# Patient Record
Sex: MALE | Race: WHITE
[De-identification: names, ages, dates, MRNs, and addresses within clinical notes are randomized per-mention and may not be internally consistent; named-entity substitution may affect disease eponyms.]

---

## 2017-06-05 ENCOUNTER — HOSPITAL ENCOUNTER (INPATIENT)
Dept: HOSPITAL 68 - SDA | Age: 72
LOS: 2 days | Discharge: HOME HEALTH | DRG: 470 | End: 2017-06-07
Attending: ORTHOPAEDIC SURGERY | Admitting: ORTHOPAEDIC SURGERY
Payer: COMMERCIAL

## 2017-06-05 VITALS — WEIGHT: 182 LBS | BODY MASS INDEX: 29.25 KG/M2 | HEIGHT: 66 IN

## 2017-06-05 VITALS — SYSTOLIC BLOOD PRESSURE: 154 MMHG | DIASTOLIC BLOOD PRESSURE: 84 MMHG

## 2017-06-05 VITALS — DIASTOLIC BLOOD PRESSURE: 84 MMHG | SYSTOLIC BLOOD PRESSURE: 128 MMHG

## 2017-06-05 VITALS — SYSTOLIC BLOOD PRESSURE: 122 MMHG | DIASTOLIC BLOOD PRESSURE: 70 MMHG

## 2017-06-05 VITALS — DIASTOLIC BLOOD PRESSURE: 70 MMHG | SYSTOLIC BLOOD PRESSURE: 110 MMHG

## 2017-06-05 VITALS — SYSTOLIC BLOOD PRESSURE: 120 MMHG | DIASTOLIC BLOOD PRESSURE: 80 MMHG

## 2017-06-05 DIAGNOSIS — J44.9: ICD-10-CM

## 2017-06-05 DIAGNOSIS — C61: ICD-10-CM

## 2017-06-05 DIAGNOSIS — M17.11: Primary | ICD-10-CM

## 2017-06-05 DIAGNOSIS — E78.5: ICD-10-CM

## 2017-06-05 DIAGNOSIS — Z87.891: ICD-10-CM

## 2017-06-05 PROCEDURE — C1713 ANCHOR/SCREW BN/BN,TIS/BN: HCPCS

## 2017-06-05 PROCEDURE — 3E0T3BZ INTRODUCTION OF ANESTHETIC AGENT INTO PERIPHERAL NERVES AND PLEXI, PERCUTANEOUS APPROACH: ICD-10-PCS

## 2017-06-05 PROCEDURE — 0SRC0J9 REPLACEMENT OF RIGHT KNEE JOINT WITH SYNTHETIC SUBSTITUTE, CEMENTED, OPEN APPROACH: ICD-10-PCS | Performed by: ORTHOPAEDIC SURGERY

## 2017-06-05 PROCEDURE — 2NASP: CPT

## 2017-06-05 NOTE — PN- ORTHOPEDIC
Subjective
Subjective:
POSTOP CHECK
 
no pain, feeling well, tolerating reg diet, OOB ambulating already, wants yost 
out, no paresthesias, no cp/sob/n/v
 
Objective
Vital Signs and I&Os
Vital Signs
 
 
Date Time Temp Pulse Resp B/P B/P Pulse O2 O2 Flow FiO2
 
     Mean Ox Delivery Rate 
 
06/05 1441       Room Air Room Air 
 
06/05 1439      97 Room Air Room Air 
 
06/05 1422 97.2 88 18 120/80  98 Room Air  
 
06/05 1323 97.7 63 20 110/70  94 Nasal 2.5L 
 
       Cannula  
 
06/05 1300      94 Nasal 2.5L 
 
       Cannula  
 
 
 Intake & Output
 
 
 06/05 1600 06/05 0800 06/05 0000 06/04 1600 06/04 0800 06/04 0000
 
Intake Total 555     
 
Output Total      
 
Balance 555     
 
       
 
Intake, IV 75     
 
Intake, Oral 480     
 
Patient 182 lb     
 
Weight      
 
Weight Reported by Patient     
 
Measurement      
 
Method      
 
 
 
Physical Exam:
GEN: NAD
CARD: s1s2 RRR
PULM: CTAB
ABD: soft nt
EXT: RLE palp pulses, dressing CDI, ONQ in place, +dorsi/plantar flexion, 
sensation intact/equal bl
 
Assessment/Plan
Assessment/Plan
A:
POD0 sp R TKR, stable, OOB ambulating.
 
P: 
OOB with PT
DC yost
coum per INR
prn pain meds
dc planning
 
 
 
Core Measures/Miscellaneous
 
Venous Thromboembolism
VTE Risk Factors: Surgery
VTE Contraindications: No Contraindications
VTE Diagnosis: No
 
Beta Blocker
Is Beta Blocker a Home Med? No
 
Antibiotics
Is Patient on Antibiotics? Yes

## 2017-06-05 NOTE — OPERATIVE REPORT
Operative/Inv Procedure Report
Surgery Date: 06/05/17
Name of Procedure:
Right total knee arthroplasty
#2 grafting of large tibial cyst
Pre-Operative Diagnosis:
Right knee primary osteoarthritis
Post-Operative Diagnosis:
Same
Estimated Blood Loss: less than 50ml
Surgeon/Assistant:
YON RODRÍGUEZ,RICARDA SANTOS
 
Anesthesia: block
Implants:
Dingle triathlon total knee system-right size 5 femur, size 6 tibia, 11 mm 
cruciate retaining polyethylene, 31 patella
Drains:
None
Specimens:
Femoral, tibial, patellar bone
Microbiology:
Urine
Tourniquet:
56 minutes
Complications:
None
Condition:
Stable
Operative Indication:
Patient is a 71-year-old man with a long history of gradually worsening right 
knee pain.  On examination he was found to have a significant varus deformity 
and antalgic gait as well as significant varus thrust.  X-rays confirmed severe 
end-stage osteoarthritis.  Patient underwent conservative measures but minimal 
relief.  He wished to proceed with total knee arthroplasty after risks benefits 
and expectations of the surgical procedure were discussed which included but 
were not limited to persistent knee pain, need for subsequent surgery, infection
, DVT, injury to blood vessel or nerve, anesthesia risks.
 
Operative/Procedure Note
Note:
Patient was brought to the operating room and transferred to the operating 
table.  Once under appropriate anesthesia the right lower extremity was prepped 
and draped in standard fashion.  Preoperative IV antibiotics were given 
prophylactically.  The leg was elevated exsanguinated tourniquet was inflated.  
A standard anterior incision was made for anticipated medial parapatellar 
approach to the knee.  Incision was taken down sharply to the underlying 
retinaculum.  Incision was made in the medial retinaculum extending into the 
quadriceps tendon medially.  Small effusion was evacuated.  Osteophytes were 
excised.  There was tricompartmental osteoarthritic disease.  Severe end-stage 
eburnated bone along the medial compartment.  Laxity in the lateral aspect of 
the knee.  Remnants of the medial lateral meniscal tissues were excised.  
Remnants of the ACL was excised.  Knee was flexed patella was subluxed.  The 
drill was used to enter the intramedullary canal for the intramedullary guide 
for the distal femoral cut.  A 6 valgus cut was chosen.  This cut was made 
while protecting the soft tissues with retractors.  The femur was then sized to 
a size 5.  Size 5 cutting guide was pinned in place and the cuts were made in 
standard fashion.  I was satisfied with the preparation of the femur.  I then 
turned my attention to the tibia.  I placed the external tibial alignment guide 
in place for my tibial cut.  The cutting block was pinned in position for a 
neutral cut from medial to lateral and reproducing patient's posterior slow-
paced on preoperative templating and intraoperative measurements.  Soft tissues 
were protected medially laterally as well as posteriorly the PCL retractor.  The
PCL was recessed.  A large cyst was evaluated in the posterior aspect of the 
tibia and the central portion is extended all the way to the back wall the tibia
and extended to the mid tibia.  Soft tissues debrided for and within the cyst.  
There was sclerotic bone surrounding this area.  I then turned my attention to 
balancing the knee.  There was significant laxity laterally compared to 
medially.  Soft tissues were balanced at that point time.  I then measured the 
tibia to a size 6.  A trial was used with a size 6 tibia size 5 femur and a 9 mm
polyethylene.  The knee was taken out into full extension.  It was good full 
extension and stability in all planes however appeared that patient would most 
likely require a 11 mm insert this was to be determined and confirm later on the
case.  I then turned my attention to the patella.  The patella was measured and 
the appropriate thickness was removed and then restored with a size 31 patella. 
The 3 lug holes were drilled as medially as possible to maximize patellar 
tracking.  I was satisfied with patellar tracking.  This was would be checked 
again after the tourniquet was deflated.  I then marked my rotation of tibia 
drilled my 2 lug holes for the femur and removed all instruments from the knee. 
I then used the tibial punch to complete the preparation of the tibia with the 
appropriate position of the punch to match rotation I was predetermined by the 
trial.  I then removed all his from its and copious irrigation followed.  I used
the anterior chamfer bone to plug the distal femur to minimize postoperative 
hemarthrosis and postoperative swelling.  We also morselized portions of the 
condyles and filled the cystic defect in the tibia.  This was done after copious
irrigation of the bony surfaces.  Cement was being mixed on the back table.  
Once it was ready was applied to the dry clean bony surfaces of the tibia.  The 
size 6 tibia was impacted in place with the excess cement removed with curettes.
 Cement was applied to the dry clean bony surfaces of the femur.  The size 5 
femur was impacted in place and excess cement was removed with curettes.  The 9 
mm insert was placed in the knee and the knee was taken out to full extension.  
Cement was applied to the dry clean bony surfaces of the patella.  The size 31 
patella was impacted in place and excess cement was removed with a knife.  As 
cement was hardening I did appear articular pericapsular injection of a cocktail
which included ropivacaine with epinephrine and Toradol for postoperative pain 
and inflammation management.  Once the cement was hardening took the knee 
through range of motion.  Small pieces of excess cement were removed with 
osteotome.  I then trialed 11 mm insert.  I was satisfied with the stability in 
all planes with the 11 mm insert.  Full extension extension.  Good stability in 
full extension mid flexion and full flexion to gravity.  I then removed the 
trial component copious irrigated tibial tray.  I major there was no bone 
fragments, cement fragments, soft tissue and then I impacted the definitive size
11 mm cruciate retaining polyethylene into place.  The locking mechanism was 
confirmed.  Took the knee through range of motion.  Again I was satisfied with 
the stability in all planes.  I then copiously irrigated the knee.  The 
tourniquet was deflated and hemostasis was obtained.  There was no need for a 
drain.  I then copious irrigation irrigated the knee and every level closure was
followed by copious irrigation.  The retinacular incision was closed in the 
quadriceps tendon was closed with interrupted #1 Vicryl suture.  Subcutaneous 
tissues closed in 2 layers with 2-0 Vicryl skin was closed running 3-0 Vicryl 
suture with the knee in flexion.  Appropriate dressings were applied and patient
was awakened and taken the recovery room in good condition.  No intraoperative 
complications.  Blood loss was less than 50 mL
Discharge Disposition: PACU

## 2017-06-06 VITALS — DIASTOLIC BLOOD PRESSURE: 50 MMHG | SYSTOLIC BLOOD PRESSURE: 104 MMHG

## 2017-06-06 VITALS — DIASTOLIC BLOOD PRESSURE: 80 MMHG | SYSTOLIC BLOOD PRESSURE: 160 MMHG

## 2017-06-06 VITALS — DIASTOLIC BLOOD PRESSURE: 80 MMHG | SYSTOLIC BLOOD PRESSURE: 124 MMHG

## 2017-06-06 VITALS — SYSTOLIC BLOOD PRESSURE: 90 MMHG | DIASTOLIC BLOOD PRESSURE: 50 MMHG

## 2017-06-06 VITALS — SYSTOLIC BLOOD PRESSURE: 132 MMHG | DIASTOLIC BLOOD PRESSURE: 68 MMHG

## 2017-06-06 VITALS — SYSTOLIC BLOOD PRESSURE: 112 MMHG | DIASTOLIC BLOOD PRESSURE: 68 MMHG

## 2017-06-06 VITALS — SYSTOLIC BLOOD PRESSURE: 120 MMHG | DIASTOLIC BLOOD PRESSURE: 80 MMHG

## 2017-06-06 LAB
ABSOLUTE GRANULOCYTE CT: 8.8 /CUMM (ref 1.4–6.5)
BASOPHILS # BLD: 0.1 /CUMM (ref 0–0.2)
BASOPHILS NFR BLD: 0.5 % (ref 0–2)
EOSINOPHIL # BLD: 0.1 /CUMM (ref 0–0.7)
EOSINOPHIL NFR BLD: 0.6 % (ref 0–5)
ERYTHROCYTE [DISTWIDTH] IN BLOOD BY AUTOMATED COUNT: 14.2 % (ref 11.5–14.5)
GRANULOCYTES NFR BLD: 74.1 % (ref 42.2–75.2)
HCT VFR BLD CALC: 40.2 % (ref 42–52)
LYMPHOCYTES # BLD: 1.5 /CUMM (ref 1.2–3.4)
MCH RBC QN AUTO: 28.3 PG (ref 27–31)
MCHC RBC AUTO-ENTMCNC: 33.3 G/DL (ref 33–37)
MCV RBC AUTO: 84.9 FL (ref 80–94)
MONOCYTES # BLD: 1.5 /CUMM (ref 0.1–0.6)
PLATELET # BLD: 187 /CUMM (ref 130–400)
PMV BLD AUTO: 9.5 FL (ref 7.4–10.4)
PROTHROMBIN TIME: 13.3 SEC (ref 9.4–12.5)
RED BLOOD CELL CT: 4.74 /CUMM (ref 4.7–6.1)
WBC # BLD AUTO: 11.9 /CUMM (ref 4.8–10.8)

## 2017-06-06 NOTE — SURGICAL DISCHARGE SUMMARY
Visit Information
 
Visit Dates
Admission Date:
06/05/17
 
Discharge Date:
6/7/17
 
 
History of Present Illness
Chief Complaint:
Right knee primary osteoarthritis
 
Medical History
Blood Transfusion Hx: No
Neurological: NONE
EENT: NONE
Cardiovascular: HYPERCHOLESTERMIA
Respiratory: COPD
Gastrointestinal: NONE
Hepatic: NONE
Renal: NONE
Musculoskeletal: degen joint disease
Psychiatric: NONE
Endocrine: NONE
Blood Disorders: NONE
Cancer(s): prostate cancer, SKIN CA
GYN/Reproductive: NONE
History of MRSA: No
History of VRE: No
History of CDIFF: No
Isolation History: Standard
 
Surgical History
Pertinent Surgical History: TONSILLECTOMY PROSTATECTOMY ULNAR NERVE REPAIR SKIN 
CA EXCISION
 
Psychosocial History
Where Do You Live? Home
Who Do You Live With? Spouse
What is Your Primary Language? English
Review of Systems:
SEE H&P
 
Hospital Course
 
Course
Attending Physician:
YON RODRÍGUEZ,[facility]
 
Primary Care Physician:
PATRICIA RODRÍGUEZ,[facility] Course:
Elective scheduled right total knee arthroplasty and grafting of large tibial 
cyst on 6/5/17 for right knee primary osteoarthritis by . Coumadin 
started post-operatively for blood clot risk reduction. Pain control 
transitioned from iv to oral medication. Seen and evaluated daily by PT, with 
the goal of going home with services on post-op day #2.
 
Complications:
None
Allergies:
Coded Allergies:
lactose (GI UPSET 05/26/17)
 
 
Disposition Summary
 
Disposition
Principal Diagnosis:
Right knee primary osteoarthritis
Additional Diagnosis:
same, s/p
 
Right total knee arthroplasty
grafting of large tibial cyst
 
Discharge Disposition: home health services
 
Discharge Instructions
 
General Discharge Information
Code Status: Full Code
Patient's Diet:
regular diet as tolerated. coumadin considerations.
Patient's Activity:
weight bearing as tolerated. rolling walker assistance.
Follow-Up Instructions/Appts:
continue dry guaze dressing changes daily, right knee
blood draws for PT/INR, goal INR 2-3. coumadin dosing accordingly
continue PT
follow up with  as outpatient in 3-4 weeks
 
Medications at Discharge
Discharge Medications:
Stop taking the following medications:
Docusate Sodium (Colace) 100 MG CAPSULE ORAL DAILY 
 
Continue taking these medications:
Aspirin (Ecotrin*) 81 MG TABLET.
    1 Tablet ORAL DAILY
    Comments:
       NOT GIVEN IN HOSPITAL
 
Ferrous Sulfate (Ferrous Sulfate) 325 MG (65 MG IRON) TABLET
    1 Tablet ORAL DAILY
    Comments:
       Last Taken: 6/7/17
             Time: 9AM
 
Rosuvastatin Calcium (Crestor) 10 MG TABLET
    1 Tablet ORAL DAILY
    Comments:
       NOT GIVEN IN HOSPITAL
 
Glycopyrrolate/Formoterol Fum (Bevespi Aerosphere Inhaler) 9 MCG-4.8 MCG 
HFA.AER.AD
    2 PUFF ORAL TWICE DAILY
    Comments:
       NOT GIVEN IN HOSPITAL
 
Fluticasone Propionate (Flonase Allergy Relief) 50 MCG/ACTUATION SPRAY.SUSP
     DAILY
    Comments:
       NOT GIVEN IN HOSPITAL
 
Start taking the following new medications:
Docusate Sodium (Docusate Sodium) 100 MG CAPSULE
    100 Milligram ORAL TWICE DAILY as needed for CONSTIPATION
    Days = 14
    No Refills
    Instructions:
       STOOL SOFTENER, AVAILABLE OVER THE COUNTER
    Comments:
       Last Taken: 6/7/17
             Time: 9AM
 
Warfarin Sodium (Coumadin) 5 MG TABLET
    5 Milligram ORAL DAILY
    Qty = 30
    No Refills
    Instructions:
       DOSE ADJUSTMENT AS PER BLOOD DRAWS FOR PT/INR. GOAL INR 2-3.
    Comments:
       Last Taken: 6/6/17
             Time: 5PM
 
Oxycodone HCl/Acetaminophen (Percocet 5-325 MG Tablet) 5 MG-325 MG TABLET
    1-2 Tablet ORAL EVERY 4-6 HOURS AS NEEDED as needed for PAIN CONTROL
    Qty = 36
    No Refills
    Instructions:
       TAKE AS DIRECTED FOR PAIN CONTROL. DO NOT COMBINE WITH TYLENOL.
    Comments:
       Last Taken: 6/6/17
             Time: 8AM
 
 
Copies To:
PATRICIA RODRÍGUEZ,ALPHONSO

## 2017-06-06 NOTE — PATIENT DISCHARGE INSTRUCTIONS
Discharge Instructions
 
General Discharge Information
You were seen/treated for:
Right knee primary osteoarthritis
You had these procedures:
Surgery Date: 06/05/17
Name of Procedure:
Right total knee arthroplasty
#2 grafting of large tibial cyst
 
Watch for these problems:
FEVER>101.3, INCREASED PAIN, REDNESS/SWELLING/DRAINAGE
Other wound care:
DRY GUAZE DRESSING CHANGE DAILY. LEAVE WHITE STERI STRIPS IN PLACE.
 
Diet
Continue normal diet: Yes
Recommended Diet: Regular
Additional DIET Information:
COUMADIN CONSIDERATIONS
 
Activity
Full Activity/No Limits: Yes
Activity Self Limited: Yes
Activity Limited to: Weight bear as tolerated
Other activity limits:
AMBULATE WITH ROLLING WALKER ASSISTANCE
 
Acute Coronary Syndrome
 
Inclusion Criteria
At DC or during hospital stay patient has or had the following:
ACS DIAGNOSIS No
 
Discharge Core Measures
Meds if any: Prescribed or Continued at Discharge
Meds if any: NOT Prescribed or Continued at Discharge
 
Congestive Heart Failure
 
Inclusion Criteria
At DC or during hospital stay patient has or had the following:
CHF DIAGNOSIS No
 
Discharge Core Measures
Meds if any: Prescribed or Continued at Discharge
Meds if any: NOT Prescribed or Continued at Discharge
 
Cerebrovascular accident
 
Inclusion Criteria
At DC or during hospital stay patient has or had the following:
CVA/TIA Diagnosis No
 
Discharge Core Measures
Meds if any: Prescribed or Continued at Discharge
Meds if any: NOT Prescribed or Continued at Discharge
 
Venous thromboembolism
 
Inclusion Criteria
VTE Diagnosis No
VTE Type NONE
VTE Confirmed by (Test) NONE
 
Discharge Core Measures
- Per Current guidelines, there needs to be overlap
- treatment for the first 5 days of Warfarin therapy.
- If discharged on Warfarin prior to 5 days of
- overlap therapy, the patient will need to be
- assessed for post discharge needs including
- *Post discharge parental anticoagulation
- *Warfarin and/or parental anticoagulation education
- *Follow up date to check INR post discharge
At least 5 days overlap therapy as Inpatient No
Meds if any: Prescribed or Continued at Discharge
Note: Overlap Therapy is Warfarin and Anticoagulant
Meds if any: NOT Prescribed or Continued at Discharge

## 2017-06-06 NOTE — PN- ORTHOPEDIC
**See Addendum**
Subjective
Subjective:
POD#1 S/P RIGHT TKA
 
C/O UPPER RESP SXS NOT ALLOWING HIM TO GET SLEEP LAST NIGHT
OTHERWISE NO MAJOR ISSUES
DENEIS CP, SOB, NO N+V
 
Objective
Vital Signs and I&Os
Vital Signs
 
 
Date Time Temp Pulse Resp B/P B/P Pulse O2 O2 Flow FiO2
 
     Mean Ox Delivery Rate 
 
06/06 0740 98.3 96 20 120/80  92 Room Air  
 
06/06 0400 98.6 98 20 132/68  93 Nasal 2.5L 
 
       Cannula  
 
06/06 0227 99.5 95 20 124/80  92 Nasal 2.0L 
 
       Cannula  
 
06/05 2212 99.0 94 18 154/84  91   
 
06/05 1846 98.2 84 18 128/84  91   
 
06/05 1631 98.7 84 20 122/70  93   
 
06/05 1441       Room Air Room Air 
 
06/05 1439      97 Room Air Room Air 
 
06/05 1422 97.2 88 18 120/80  98 Room Air  
 
06/05 1323 97.7 63 20 110/70  94 Nasal 2.5L 
 
       Cannula  
 
06/05 1300      94 Nasal 2.5L 
 
       Cannula  
 
 
 Intake & Output
 
 
 06/06 0800 06/06 0000 06/05 1600 06/05 0800 06/05 0000 06/04 1600
 
Intake Total 360 660 555   
 
Output Total 450 300    
 
Balance -90 360 555   
 
       
 
Intake, IV  300 75   
 
Intake, Oral 360 360 480   
 
Output, Urine 450 300    
 
Patient   182 lb   
 
Weight      
 
Weight   Reported by Patient   
 
Measurement      
 
Method      
 
 
 
Physical Exam:
CV: RRR
LUNGS: CLEAR
ABD: SOFT, +BS
EXT: DRSG DRY
       DISTAL CMS INTACT BILAT
       NO CALF TENDERNESS
 
Assessment/Plan
Assessment/Plan
ORTHO STABLE
 
PLAN
OOB WITH PT/STAIRS TODAY
TITRATE COUMADIN DOSE FOR INR 2-3
F/U AM LABS
HOME D/C PLANNING
 
 
Core Measures/Miscellaneous
 
Venous Thromboembolism
VTE Risk Factors: Surgery
VTE Contraindications: No Contraindications
VTE Diagnosis: No
 
Beta Blocker
Is Beta Blocker a Home Med? No
 
Antibiotics
Is Patient on Antibiotics? Yes

## 2017-06-07 VITALS — DIASTOLIC BLOOD PRESSURE: 68 MMHG | SYSTOLIC BLOOD PRESSURE: 144 MMHG

## 2017-06-07 LAB
ABSOLUTE GRANULOCYTE CT: 10.2 /CUMM (ref 1.4–6.5)
BASOPHILS # BLD: 0 /CUMM (ref 0–0.2)
BASOPHILS NFR BLD: 0.3 % (ref 0–2)
EOSINOPHIL # BLD: 0.5 /CUMM (ref 0–0.7)
EOSINOPHIL NFR BLD: 3.5 % (ref 0–5)
ERYTHROCYTE [DISTWIDTH] IN BLOOD BY AUTOMATED COUNT: 14.1 % (ref 11.5–14.5)
GRANULOCYTES NFR BLD: 78.1 % (ref 42.2–75.2)
HCT VFR BLD CALC: 35.3 % (ref 42–52)
LYMPHOCYTES # BLD: 1 /CUMM (ref 1.2–3.4)
MCH RBC QN AUTO: 28.7 PG (ref 27–31)
MCHC RBC AUTO-ENTMCNC: 33.2 G/DL (ref 33–37)
MCV RBC AUTO: 86.4 FL (ref 80–94)
MONOCYTES # BLD: 1.4 /CUMM (ref 0.1–0.6)
PLATELET # BLD: 174 /CUMM (ref 130–400)
PMV BLD AUTO: 9.2 FL (ref 7.4–10.4)
PROTHROMBIN TIME: 16.4 SEC (ref 9.4–12.5)
RED BLOOD CELL CT: 4.08 /CUMM (ref 4.7–6.1)
WBC # BLD AUTO: 13.1 /CUMM (ref 4.8–10.8)

## 2017-06-07 NOTE — RADIOLOGY REPORT
EXAMINATION:
XR KNEE, RIGHT
 
CLINICAL INFORMATION:
Right total knee arthroplasty.
 
COMPARISON:
None
 
TECHNIQUE:
Right knee, AP and lateral views.
 
FINDINGS:
The components of the total knee arthroplasty are in satisfactory position
and there is normal alignment at the patellofemoral and tibiofemoral
compartments. No evidence of acute periprosthetic fracture. There is
postoperative soft tissue swelling and soft tissue emphysema of the anterior
knee.
 
IMPRESSION:
Satisfactory position and alignment of components of the right total knee
arthroplasty. No acute periprosthetic fracture.

## 2017-06-07 NOTE — PN- ORTHOPEDIC
**See Addendum**
Subjective
Subjective:
Patient's pain is much better controlled today.  He has no complaints.  No acute
events overnight, no fever or flulike illness
 
Objective
Vital Signs and I&Os
Vital Signs
 
 
Date Time Temp Pulse Resp B/P B/P Pulse O2 O2 Flow FiO2
 
     Mean Ox Delivery Rate 
 
06/07 0603 98.6 90 20 144/68  91 Nasal  
 
       Cannula  
 
06/07 0000      95 Nasal 3.0L 
 
       Cannula  
 
06/06 2224 98.1 100 20 160/80  95   
 
06/06 1733    112/68     
 
06/06 1600      92 Nasal 3.0L 
 
       Cannula  
 
06/06 1400 98.3 82 18 104/50  92 Nasal 3.0L 
 
       Cannula  
 
06/06 1130 99.5 72 18 90/50  92 Nasal 3.0L 
 
       Cannula  
 
06/06 0800       Nasal 2.5L 
 
       Cannula  
 
06/06 0740 98.3 96 20 120/80  92 Room Air  
 
 
 Intake & Output
 
 
 06/07 0800 06/07 0000 06/06 1600 06/06 0800 06/06 0000 06/05 1600
 
Intake Total 440  800 360 660 555
 
Output Total  300 50 450 300 
 
Balance 440 -300 750 -90 360 555
 
       
 
Intake,   500  300 75
 
Intake, Oral 240  300 360 360 480
 
Number 1     
 
Bowel      
 
Movements      
 
Output,   50   
 
Emesis      
 
Output, Urine  300  450 300 
 
Patient      182 lb
 
Weight      
 
Weight      Reported by Patient
 
Measurement      
 
Method      
 
 
 
Physical Exam:
Well-developed well-nourished no apparent distress.
HEENT: Atraumatic, extraocular motion intact
Neck: Supple, no lymphadenopathy
Respiratory: No respiratory distress
Extremities: No edema 
 
RIGHT lower extremity dressing in place, 
Incision line is clean dry and intact with minimal bloody drainage. No signs of 
infection. 
Mild joint effusion
Range of motion is 0-60. 
Compression wrap in place.  
ALPS in place
Neurovascularly intact distally
Bilateral calves are supple, nontender.
Neuro: Alert and oriented x3
Psych: Mood affect normal, normal memory normal judgment.
Skin: Warm and dry, no rash on exposed skin
 
Results
Last 48 Hours of Labs:
 Laboratory Tests
 
 
 06/07 06/06
 
 0600 0620
 
Chemistry  
 
  Sodium (137 - 145 mmol/L)  137
 
  Potassium (3.5 - 5.1 mmol/L)  4.4
 
  Chloride (98 - 107 mmol/L)  103
 
  Carbon Dioxide (22 - 30 mmol/L)  25
 
  Anion Gap (5 - 16)  10
 
  BUN (9 - 20 mg/dL)  15
 
  Creatinine (0.7 - 1.2 mg/dL)  0.7
 
  Estimated GFR (>60 ml/min)  > 60
 
  BUN/Creatinine Ratio (7 - 25 %)  21.4
 
Coagulation  
 
  PT (9.4 - 12.5 SEC) Pending 13.3  H
 
  INR (0.90 - 1.17) Pending 1.27  H
 
Hematology  
 
  CBC w Diff Pending NO MAN DIFF REQ
 
  WBC (4.8 - 10.8 /CUMM) Pending 11.9  H
 
  RBC (4.70 - 6.10 /CUMM) Pending 4.74
 
  Hgb (14.0 - 18.0 G/DL) Pending 13.4  L
 
  Hct (42 - 52 %) Pending 40.2  L
 
  MCV (80.0 - 94.0 FL) Pending 84.9
 
  MCH (27.0 - 31.0 PG) Pending 28.3
 
  RDW (11.5 - 14.5 %) Pending 14.2
 
  Plt Count (130 - 400 /CUMM) Pending 187
 
  MPV (7.4 - 10.4 FL) Pending 9.5
 
  Gran % (42.2 - 75.2 %)  74.1
 
  Lymphocytes % (20.5 - 51.1 %)  12.5  L
 
  Monocytes % (1.7 - 9.3 %)  12.3  H
 
  Eosinophils % (0 - 5 %)  0.6
 
  Basophils % (0.0 - 2.0 %)  0.5
 
  Absolute Granulocytes (1.4 - 6.5 /CUMM)  8.8  H
 
  Absolute Lymphocytes (1.2 - 3.4 /CUMM)  1.5
 
  Absolute Monocytes (0.10 - 0.60 /CUMM)  1.5  H
 
  Absolute Eosinophils (0.0 - 0.7 /CUMM)  0.1
 
  Absolute Basophils (0.0 - 0.2 /CUMM)  0.1
 
  PUBS MCHC (33.0 - 37.0 G/DL) Pending 33.3
 
 
 
 
Assessment/Plan
Assessment/Plan
Postop day #2 status post right total knee arthroplasty
 
 
Wean O2
Pain medication as needed
DVT prophylaxis with Coumadin, adjust dose per INR-P
Labs pending this morning
Perioperative antibiotics completed
Out of bed with physical therapy
Possible discharge to home this afternoon as patient states that he would like 
to, and he feels well to go home today.  We'll need to wean his O2 and check 
morning labs
dressing changed today
cont alps
 
 
Core Measures/Miscellaneous
 
Venous Thromboembolism
VTE Risk Factors: Surgery
VTE Contraindications: No Contraindications
VTE Diagnosis: No
 
Beta Blocker
Is Beta Blocker a Home Med? No
 
Antibiotics
Is Patient on Antibiotics? Yes

## 2018-01-06 ENCOUNTER — HOSPITAL ENCOUNTER (INPATIENT)
Dept: HOSPITAL 68 - ERH | Age: 73
LOS: 3 days | DRG: 312 | End: 2018-01-09
Attending: INTERNAL MEDICINE | Admitting: INTERNAL MEDICINE
Payer: COMMERCIAL

## 2018-01-06 VITALS — HEIGHT: 66 IN | BODY MASS INDEX: 26.03 KG/M2 | WEIGHT: 162 LBS

## 2018-01-06 VITALS — DIASTOLIC BLOOD PRESSURE: 64 MMHG | SYSTOLIC BLOOD PRESSURE: 118 MMHG

## 2018-01-06 VITALS — SYSTOLIC BLOOD PRESSURE: 130 MMHG | DIASTOLIC BLOOD PRESSURE: 60 MMHG

## 2018-01-06 DIAGNOSIS — Z85.46: ICD-10-CM

## 2018-01-06 DIAGNOSIS — R09.02: ICD-10-CM

## 2018-01-06 DIAGNOSIS — E78.5: ICD-10-CM

## 2018-01-06 DIAGNOSIS — R55: Primary | ICD-10-CM
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
## 2018-01-06 DIAGNOSIS — S01.81XA: ICD-10-CM

## 2018-01-06 DIAGNOSIS — R00.0: ICD-10-CM

## 2018-01-06 DIAGNOSIS — J44.9: ICD-10-CM

## 2018-01-06 DIAGNOSIS — Z85.828: ICD-10-CM

## 2018-01-06 DIAGNOSIS — I25.2: ICD-10-CM

## 2018-01-06 DIAGNOSIS — E78.00: ICD-10-CM
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
## 2018-01-06 LAB
ABSOLUTE GRANULOCYTE CT: 11.3 /CUMM (ref 1.4–6.5)
APTT BLD: 31 SEC (ref 25–37)
BASOPHILS # BLD: 0.1 /CUMM (ref 0–0.2)
BASOPHILS NFR BLD: 0.5 % (ref 0–2)
EOSINOPHIL # BLD: 0.2 /CUMM (ref 0–0.7)
EOSINOPHIL NFR BLD: 1.2 % (ref 0–5)
ERYTHROCYTE [DISTWIDTH] IN BLOOD BY AUTOMATED COUNT: 15.7 % (ref 11.5–14.5)
GRANULOCYTES NFR BLD: 80.2 % (ref 42.2–75.2)
HCT VFR BLD CALC: 47.4 % (ref 42–52)
LYMPHOCYTES # BLD: 1.7 /CUMM (ref 1.2–3.4)
MCH RBC QN AUTO: 28 PG (ref 27–31)
MCHC RBC AUTO-ENTMCNC: 32 G/DL (ref 33–37)
MCV RBC AUTO: 87.7 FL (ref 80–94)
MONOCYTES # BLD: 0.8 /CUMM (ref 0.1–0.6)
PLATELET # BLD: 261 /CUMM (ref 130–400)
PMV BLD AUTO: 8.4 FL (ref 7.4–10.4)
PROTHROMBIN TIME: 11.2 SEC (ref 9.4–12.5)
RED BLOOD CELL CT: 5.4 /CUMM (ref 4.7–6.1)
WBC # BLD AUTO: 14.1 /CUMM (ref 4.8–10.8)

## 2018-01-06 PROCEDURE — G0378 HOSPITAL OBSERVATION PER HR: HCPCS

## 2018-01-06 PROCEDURE — 0HQ1XZZ REPAIR FACE SKIN, EXTERNAL APPROACH: ICD-10-PCS | Performed by: PEDIATRICS

## 2018-01-06 NOTE — CONS- NEUROLOGY
General Information and HPI
 
Consulting Request
Date of Consult: 01/06/18
Requested By:
James Mcgregor MD
 
History of Present Illness:
72-year-old male presents following an episode of unwitnessed fall and blunt 
head trauma.  The patient states that several minutes after arising from bed, 
going to the kitchen and beginning to eat a cookie, he abruptly fell.  His wife 
heard a thud from the other room and was quickly on the scene.  There were no 
reported involuntary movements.  He suffered mild facial trauma.  He denies any 
prior history of syncope or seizure.  No recent intercurrent illness.  He denies
palpitations.  On the way to the floor, nursing has noted an irregular 
heartbeat.
 
Allergies/Medications
Allergies:
Coded Allergies:
lactose (GI UPSET 08/07/17)
 
Home Med List:
Albuterol Sulfate (Proair Hfa) 90 MCG HFA.AER.AD   2 PUF INH Q4-6 PRN PRN copd  
(Reported)
Docusate Sodium 100 MG CAPSULE   100 MG PO BID PRN CONSTIPATION
     STOOL SOFTENER, AVAILABLE OVER THE COUNTER
Duloxetine HCl 60 MG CAPSULE.DR   1 CAP PO DAILY DEPRESSION  (Reported)
Fluticasone Propionate (Flonase Allergy Relief) 50 MCG/ACTUATION SPRAY.SUSP 
nasal congestion  (Reported)
Glycopyrrolate/Formoterol Fum (Bevespi Aerosphere Inhaler) 9 MCG-4.8 MCG 
HFA.AER.AD   2 PUFF PO BID COPD  (Reported)
Mirtazapine 15 MG TABLET   1 TAB PO QPM SLEEP HELP  (Reported)
Rosuvastatin Calcium (Crestor) 10 MG TABLET   1 TAB PO DAILY CHOLESTEROL  (
Reported)
 
 
Review of Systems
Review of Systems:
Chronic shortness of breath for which she sees Dr. Dozier no recent fever, chills
, rash, diplopia, dysarthria, dysphagia, chest pain, palpitations, vomiting, 
vertigo, gait ataxia, joint inflammation or bleeding disturbance
 
Past History
 
Travel History
Traveled to [place] past 21 day No
 
Medical History
Neurological: NONE
EENT: NONE
Cardiovascular: HYPERCHOLESTERMIA
Respiratory: COPD
Gastrointestinal: NONE
Hepatic: NONE
Renal: NONE
Musculoskeletal: degen joint disease
Psychiatric: NONE
Endocrine: NONE
Blood Disorders: NONE
Cancer(s): prostate cancer, SKIN CA
GYN/Reproductive: NONE
 
Surgical History
Surgical History: TONSILLECTOMY PROSTATECTOMY ULNAR NERVE REPAIR SKIN CA 
EXCISION
 
Exam & Diagnostic Data
Vital Signs and I&O
Vital Signs
 
 
Date Time Temp Pulse Resp B/P B/P Pulse O2 O2 Flow FiO2
 
     Mean Ox Delivery Rate 
 
01/06 1711 98.7 96 20 130/60  93 Nasal 2.0L 
 
       Cannula  
 
01/06 1625 98.9 95 18 120/72  95 Room Air  
 
01/06 1402 99.0 94 20 121/80  94 Nasal  
 
       Cannula  
 
01/06 1212 98.1 98 20 133/74  93 Nasal 2.0L 
 
       Cannula  
 
01/06 1017 98.2 99 20 156/64  93 Nasal 2.0L 
 
       Cannula  
 
01/06 0637  114  150/67     
 
01/06 0625 98.4 110 20 128/79  93 Nasal 3.0L 
 
       Cannula  
 
01/06 0444 98.5 111 20 120/71  93 Nasal 2.0L 
 
       Cannula  
 
01/06 0136 98.5 93 20 150/72  94 Room Air  
 
 
 Intake & Output
 
 
 01/06 1600 01/06 0800 01/06 0000
 
Intake Total   
 
Output Total   
 
Balance   
 
    
 
Patient  162 lb 
 
Weight   
 
Weight  Reported by Patient 
 
Measurement   
 
Method   
 
 
Pleasant elderly gentleman in no acute distress.  Higher cortical function was 
intact.  Speech was fluent.  Head was normocephalic.  There was an early left 
periorbital ecchymosis.  Pupils were equal and reactive.  Extraocular movements 
were full.  Face was symmetric.  Hearing was normal.  Tongue was midline.  Motor
examination showed no focal or lateralizing weakness.  Deep tendon reflexes were
symmetric.  Plantar responses were equivocally extensor bilaterally.  Gait was 
untested.
 
CT scan of head showed no acute abnormalities.
 
Assessment/Plan
Assessment:
#1 syncope
 
#2 blunt head trauma
 
Primary CNS disturbance is considered highly unlikely.  Cardiac etiology should 
be explored.  Should no obvious underlying reason for his syncope be discovered,
EEG would then be recommended however yield expected to be low.
Recommendations:
As above
 
Please feel free to call with any further questions.
 
 
Consult Acknowledgment
- Thank you for your consult request.

## 2018-01-06 NOTE — PN- ATT ADDEND
Attending Addendum
Attending Brief Note
S: The patient denied any further syncope or symptoms other than fatigue. Admits
po intake has been only fair. Noted to be tachycardic on monitor (100-110).
 
O:
VS:
 Vital Signs
 
 
Date Time Temp Pulse Resp B/P B/P Pulse O2 O2 Flow FiO2
 
     Mean Ox Delivery Rate 
 
01/06 1711 98.7 96 20 130/60  93 Nasal 2.0L 
 
       Cannula  
 
01/06 1625 98.9 95 18 120/72  95 Room Air  
 
01/06 1402 99.0 94 20 121/80  94 Nasal  
 
       Cannula  
 
01/06 1212 98.1 98 20 133/74  93 Nasal 2.0L 
 
       Cannula  
 
01/06 1017 98.2 99 20 156/64  93 Nasal 2.0L 
 
       Cannula  
 
01/06 0637  114  150/67     
 
01/06 0625 98.4 110 20 128/79  93 Nasal 3.0L 
 
       Cannula  
 
01/06 0444 98.5 111 20 120/71  93 Nasal 2.0L 
 
       Cannula  
 
01/06 0136 98.5 93 20 150/72  94 Room Air  
 
 
 Intake & Output
 
 
 01/06 1600 01/06 0800 01/06 0000
 
Intake Total   
 
Output Total   
 
Balance   
 
    
 
Patient  162 lb 
 
Weight   
 
Weight  Reported by Patient 
 
Measurement   
 
Method   
 
 
 Current Medications
 
 
  Sig/Ariadna Start time  Last
 
Medication Dose Route Stop Time Status Admin
 
Acetaminophen 650 MG Q6P PRN 01/06 0515 AC 
 
  PO   
 
Albuterol Sulfate 2 PUF Q4-6 PRN PRN 01/06 0530 AC 
 
  INH   
 
Atorvastatin Calcium 40 MG 1700 01/06 1700 AC 
 
  PO   
 
Budesonide/ 2 PUF BID 01/06 1100 AC 01/06
 
Formoterol Fumarate  INH   1100
 
Duloxetine HCl 60 MG DAILY 01/06 1000 AC 01/06
 
  PO   0912
 
Enoxaparin Sodium 40 MG DAILY 01/06 1000 AC 01/06
 
  SC   0911
 
Fluticasone  2 SPRAY DAILY PRN 01/06 0530 AC 
 
Propionate  CONCEPCION   
 
Non-Formulary  0 BID 01/06 1000 DC 
 
Medication  ANY   
 
Sodium Chloride 1,000 ML Q10H 01/06 1430 AC 01/06
 
  IV   1718
 
Tetanus/Diphtheria  0 .STK-MED ONE 01/06 0446 DC 
 
Toxoids Adsorbed  IM   
 
Tetanus/Diphtheria  0.5 ML ONCE ONE 01/06 0415 DC 01/06
 
Toxoids Adsorbed  IM 01/06 0416  0448
 
Tiotropium [place] 1 PUF DAILY 01/06 1100 AC 01/06
 
  INH   1100
 
 
Physical Exam:
HEENT: john- dry mucosa
Neck: no JVD/bruits
Chest: clear
Cor: tachy, reg, nl S1, S2 w/o murm
Abd: BS+, soft, NT
Ext: no edema
Neuro: alert & oriented, non-focal
 
Labs:
 Laboratory Tests
 
01/06/18 0835:
Troponin I < 0.01
 
01/06/18 0725:
Urine Color YEL, Urine Clarity CLEAR, Urine pH 6.0, Ur Specific Gravity >= 1.030
, Urine Protein NEG, Urine Ketones NEG, Urine Nitrite NEG, Urine Bilirubin NEG, 
Urine Urobilinogen 0.2, Ur Leukocyte Esterase NEG, Ur Microscopic EXAM NOT 
REQUIRED, Urine Hemoglobin NEG, Urine Glucose NEG
 
01/06/18 0545:
PT 11.2, INR 1.07, APTT 31, D-Dimer High Sensitivity 237
 
01/06/18 0513:
Methadone Screen Cancelled, Barbiturate Screen Cancelled, Ur Phencyclidine Scrn 
Cancelled, Amphetamines Screen Cancelled, U Benzodiazepines Scrn Cancelled, 
Urine Cocaine Screen Cancelled, Urine Cannabis Screen Cancelled
 
01/06/18 0228:
Anion Gap 10, Estimated GFR > 60, BUN/Creatinine Ratio 26.7  H, Glucose 119  H, 
Calcium 9.3, Phosphorus 4.1, Magnesium 1.8, Total Bilirubin 0.4, AST 39, ALT 44,
Alkaline Phosphatase 63, Creatine Kinase 472  H, Troponin I < 0.01, Total 
Protein 6.1  L, Albumin 3.7, Globulin 2.4, Albumin/Globulin Ratio 1.5, Prolactin
9.3, CBC w Diff NO MAN DIFF REQ, RBC 5.40, MCV 87.7, MCH 28.0, RDW 15.7  H, MPV 
8.4, Gran % 80.2  H, Lymphocytes % 12.2  L, Monocytes % 5.9, Eosinophils % 1.2, 
Basophils % 0.5, Absolute Granulocytes 11.3  H, Absolute Lymphocytes 1.7, 
Absolute Monocytes 0.8  H, Absolute Eosinophils 0.2, Absolute Basophils 0.1, 
PUBS MCHC 32.0  L
 
Impression/Plan:
#S/P Syncope- etiology still unclear. No focal neuro symptoms. Neurology input 
appreciated. Unlikely to represent seizure. Noted baseline sinus tachycardia on 
monitor. The patient had seen Dr. Aguilera for cardiac clearance prior to his 
recent surgery. 
Plan: Continue telemetry monitoring at present. IV hydration. Observe for 
further symptoms. 
 
#Diphragmatic Paralysis/Hypoxemia- ? pulse ox 93% on 2L. With tachycardia and 
syncope concern regarding pulmonary embolism, however D-dimer is low. Patient 
may need home oxygen.
Plan: Agree with overnight pulse ox - pulmonary eval in morning (Dr. Dozier is 
his pulmonary MD). 
Lovenox as ordered.

## 2018-01-06 NOTE — HISTORY & PHYSICAL
Skylar RODRÍGUEZ,[facility] 01/06/18 0408:
General Information and [facility]
MD Statement:
I have seen and personally examined MICAH SANTIAGO JR and documented this H&P.
 
The patient is a 72 year old M who presented with a patient stated chief 
complaint of [syncopal attack].
 
Source of Information: patient, family
Exam Limitations: no limitations
History of Present Illness:
Mr. Santiago is 72 year old male with chief complain of syncopal episode and 
fall.  Patient has past medical history significant for hyperlipidemia, 
degenerative disc disease with severe central canal stenosis and cord 
compression status post surgical decompression in November 2017, osteoarthritis 
of right knee status post knee replacement June 2017, prostate cancer status 
post excision 2013, skin cancer.  History was obtained from patient and his 
wife.  
Patient reported that he woke up around 1:30 a.m. and decided to go get 
something to eat from the kitchen, denied feeling hungry or craving for sugar, 
he didn't feel any weakness, numbness, dizziness, blurry vision, chest pain, 
palpitation or shortness of breath, went to the kitchen and all of a sudden lost
consciousness and wasn't able to remember what exactly happened to him.  Wife 
heared a bang and found him on the floor, blood around his face from a 
laceration on the lateral surface of left eyebrow.  Wife didn't notice tonic-
clonic movement nor urinary or stool incontinence.  A few minutes later patient 
woke up but was disoriented, and gain his orientation couple of minutes later.  
Patient was brought in by his wife.  He denied any recent history of infection, 
history of poor oral intake, nausea or vomiting, diarrhea, increased urine 
frequency or dysuria, weakness, numbness, tinnitus, dizziness.  No previous 
history of similar episodes.
Patient follow-up with neurology and neurosurgery for the spinal cord 
compression, procedure was uneventfull in November 2017, history of left upper 
extremity weakness. He follow up jennifer Dozier MD for "diaphragmatic 
dysfunction" that caused him shortness of breath on exertion as mild as changing
clothes, orthopnea but no paroxysmal nocturnal dyspnea. Urology for prostate 
cancer and Dr. Blankenship for preprocedure clearance.  History of echocardiogram 
and stress test 2017 for surgical clearance within normal per patient.
Patient denied history of smoking, used to drink 2 drinks of white wine in a 
week however didn't drink in the last couple of weeks, no ilict drugs.
 
Allergies/Medications
Allergies:
Coded Allergies:
lactose (GI UPSET 08/07/17)
 
Home Med list
Albuterol Sulfate (Proair Hfa) 90 MCG HFA.AER.AD   2 PUF INH Q4-6 PRN PRN copd  
(Reported)
Docusate Sodium 100 MG CAPSULE   100 MG PO BID PRN CONSTIPATION
     STOOL SOFTENER, AVAILABLE OVER THE COUNTER
Duloxetine HCl 60 MG CAPSULE.DR   1 CAP PO DAILY DEPRESSION  (Reported)
Fluticasone Propionate (Flonase Allergy Relief) 50 MCG/ACTUATION SPRAY.SUSP 
nasal congestion  (Reported)
Glycopyrrolate/Formoterol Fum (Bevespi Aerosphere Inhaler) 9 MCG-4.8 MCG 
HFA.AER.AD   2 PUFF PO BID COPD  (Reported)
Mirtazapine 15 MG TABLET   1 TAB PO QPM SLEEP HELP  (Reported)
Rosuvastatin Calcium (Crestor) 10 MG TABLET   1 TAB PO DAILY CHOLESTEROL  (
Reported)
 
 
Past History
 
Travel History
Traveled to [place] past 21 day No
 
Medical History
Neurological: NONE
EENT: NONE
Cardiovascular: HYPERCHOLESTERMIA
Respiratory: COPD
Gastrointestinal: NONE
Hepatic: NONE
Renal: NONE
Musculoskeletal: degen joint disease
Psychiatric: NONE
Endocrine: NONE
Blood Disorders: NONE
Cancer(s): prostate cancer, SKIN CA
GYN/Reproductive: NONE
History of MRSA: No
History of VRE: No
History of CDIFF: No
 
Surgical History
Surgical History: TONSILLECTOMY PROSTATECTOMY ULNAR NERVE REPAIR SKIN CA 
EXCISION
 
Review of Systems
 
Review of Systems
Constitutional:
Denies: chills, diaphoresis, fever, malaise, weakness. 
EENTM:
Denies: blurred vision, ear pain, nasal congestion, nasal pain, throat pain. 
Cardiovascular:
Reports: orthopena, syncope.  Denies: chest pain, palpitations. 
Respiratory:
Reports: orthopnea, short of breath.  Denies: cough, sputum production, 
wheezing. 
GI:
Denies: abdominal pain, constipation, diarrhea, nausea, vomiting. 
Genitourinary:
Denies: dysuria. 
Musculoskeletal:
Denies: back pain, joint pain, muscle pain. 
Skin:
Denies: rash. 
Neurological/Psychological:
Denies: anxiety, confusion, headache, numbness, tingling, tremors. 
Hematologic/Endocrine:
Denies: bruising. 
 
Exam & Diagnostic Data
Last 24 Hrs of Vital Signs/I&O
 Vital Signs
 
 
Date Time Temp Pulse Resp B/P B/P Pulse O2 O2 Flow FiO2
 
     Mean Ox Delivery Rate 
 
01/06 0444 98.5 111 20 120/71  93 Nasal 2.0L 
 
       Cannula  
 
01/06 0136 98.5 93 20 150/72  94 Room Air  
 
 
 Intake & Output
 
 
 01/06 0800 01/06 0000 01/05 1600
 
Intake Total   
 
Output Total   
 
Balance   
 
    
 
Patient 73.482 kg  
 
Weight   
 
Weight Reported by Patient  
 
Measurement   
 
Method   
 
 
 
 
Physical Exam
General Appearance Alert, Oriented X3, Cooperative, No Acute Distress, 
frustrated
Skin No Rashes
Skin Temp/Moisture Exam: Warm/Dry
HEENT Atraumatic, PERRLA, EOMI, Mucous Membr. moist/pink
Neck Supple
Lymphatic No cervical lymphadenopathy 
Cardiovascular Regular Rate, Normal S1, Normal S2, No Murmurs
Lungs Clear to Auscultation, Decrease air entery of left lung 
Abdomen Normal Bowel Sounds, Soft, No Tenderness
Neurological Normal Speech, Strength at 5/5 X4 Ext, Normal Tone, Sensation 
Intact, Cranial Nerves 3-12 NL, Hyperreflexia x4 clonus fasculation 
Extremities No Clubbing, No Cyanosis, No Edema, Normal Pulses
 
Assessment/Plan
Assessment:
Mr. Santiago is 72 year old male with chief complain of syncopal episode and 
fall.  Patient has past medical history significant for hyperlipidemia, 
degenerative disc disease with severe central canal stenosis and cord 
compression status post surgical decompression in November 2017, osteoarthritis 
of right knee status post knee replacement June 2017, prostate cancer status 
post excision 2013, skin cancer. 
 
On admission, vital signs 98.5, blood pressure 150/72, pulse 93 regular, 
respiratory rate 20 saturating 94% room air
Significant for leukocytosis of 14.1 with left shift but no bands, electrolytes 
within normal, troponin is negative
 
Chest x-ray:
The lungs are hypoexpanded with associated central bronchovascular
prominence. Streaky left basal opacities are noted. Cardiac mediastinal
silhouette appears stable. No pneumothorax. No acute osseous abnormalities.
 
CT head and cervical spine without contrast
IMPRESSION:
1. Left frontal scalp contusion. No acute intracranial pathology.
2. No evidence of acute cervical spine traumatic injury. Multilevel
degenerative change with bilateral C6 laminectomies.
3. Hypoexpanded lungs with central bronchovascular prominence. Streaky left
basal opacity which is nonspecific and may represent subsegmental
atelectasis.
 
Problem list
#Syncopal attack
#Sinus tachycardia
#Abnormal neuro exam with hyperreflexia 4, fasculation and clonus with possible
diagnosis of amyotrophic lateral sclerosis
#Shortness of breath and orthopnea in setting of dysfunctional diaphragm
#Leukocytosis that could be reactive
 
Plan
-Admit to telemetry floor
-Vital signs every shift
-Obtain orthostatic measurement
-Add on prolactin
-Obtain d-dimer
-Echocardiogram
-Cardiology consultation 
-Troponin and EKG
-Coagulation profile
-TRC
-Continue home medication atorvastatin, albuterol, Flonase, Cymbalta
-Code full
-DVT prophylaxis Lovenox
-Diet regular
 
 
 
As Ranked By This Provider
Problem List:
 1. Syncope
 
 
Core Measures/Misc (9/17)
 
Acute Coronary Syndrome
ACS Diagnosis: No
 
Congestive Heart Failure
Congestive Heart Failure Diagnosis No
 
Cerebrovascular Accident
CVA/TIA Diagnosis: No
 
VTE (View Protocol)
VTE Risk Factors Age>40
No Mechanical VTE Prophylaxis d/t N/A MechProphylax Ordered
No VTE Pharm Prophylaxis d/t NA PharmProphylax ordered
 
Sepsis (View protocol)
Sepsis Present: No
 
 
James Mcgregor 01/06/18 0712:
Attending MD Review Statement
 
Attending Statement
Attending MD Statement: examined this patient, discuss w/resident/PA/NP, agreed 
w/resident/PA/NP, discussed with family, reviewed EMR data (avail), reviewed 
images, amended to note
Attending Assessment/Plan:
 
CC: Syncope and collapse
PMH: HLD, degenerative disease, severe central canal stenosis and cord 
compression S/P surgical decompression in November 2017, S/P right total knee 
replacement, left-sided diaphragmatic Paralysis
 
Patient woke up early morning and was going to kitchen to grab a bite, wife was 
in the other room, she heard a big bang, she went to check on him and found 
unresponsive on the floor, he shouldn't woke up in few seconds but had blood all
over his face, and later was found to have laceration on left forehead, patient 
did not have any prodromal symptoms before the fall, no chest pain palpitations 
after the fall, no seizure-like activity noted, no bowel or bladder incontinence
, no tongue bite. 
 
Vitals: Afebrile, mildly tachycardic 110s, RR 20, blood pressure 150/72, 
saturating 94% on 2 L NC
On exam: A O 3, cooperative, no acute distress, neck supple, JVD normal, no 
lymphadenopathy, mucosa moist, decreased strength left upper extremity, 4/5, 
muscle twitching in all muscle groups including upper arm muscles thigh muscles,
hyperreflexic with clonus, no dependent edema, no obvious skin rashes or 
inflammation , legs unequal R> L CVS: S1-S2, RRR. RS: Decreased air entry on 
left side,. Abdomen: Soft, NT, ND, bowel sounds present.
 
Labs: WBC 14.1, Neutrophils 80%, no band, hemoglobin 15.1, hematocrit 47.4, BMP 
unremarkable except bicarbonate 31, troponin less than 0.01, INR 1.07 
 
CT head, CT cervical spine, chest x-ray reviewed
1. Left frontal scalp contusion. No acute intracranial pathology.
2. No evidence of acute cervical spine traumatic injury. Multilevel
degenerative change with bilateral C6 laminectomies.
3. Hypoexpanded lungs with central bronchovascular prominence. Streaky left
basal opacity which is nonspecific and may represent subsegmental
atelectasis.
ECG: ? Q waves 2 3 aVF
 
Assessment and plan
 
72-year-old male presented in ER for sudden fall and loss of consciousness. From
description of the fall it appears to be syncope and collapse, there is no 
prodrome, there is no chest pain palpitations shortness of breath after the 
episode, mild confusion after waking up but there is no seizure-like activity 
noted, no bladder or bowel incontinence, no tongue bites. Physical exam 
unremarkable except twitching of muscles, hyperreflexia and clonus (patient is 
being evaluated for that with EMG, they're evaluating for ALS as well) ECG 
unremarkable, labs unremarkable. Given the abrupt fall and collapse arrhythmias 
should be ruled out, he would benefit from Observation in telemetry for 
evaluation of the syncopal episode. Less likely vasovagal. Dehydration as a 
possibility as patient did not drink anything other than a soda the whole day, 
blood pressure is normal, she check orthostatics. Only change in medication and 
starting the Remeron, which cannot explain the symptoms.. , PE should be ruled 
out, less possibility with low well's score but patient has an equal swelling on
lower extremity right more than left probably secondary to surgery. 
 
+ Syncope and collapse
+ Laceration on left forehead 
+ History of HLD, degenerative disease, severe central canal stenosis and cord 
compression S/P surgical decompression in November 2017, S/P right total knee 
replacement, left-sided diaphragmatic Paralysis 
 
- Place in observation on telemetry
- Continuous telemetry monitoring
- Serial troponin and EKG
- Check d-dimer and prolactin to the sample in lab
- 2-D echocardiogram
- Orthostatic vitals
- Cardiology consult in a.m.
- Check magnesium, phosphorus, CPK 
- DVT prophylaxis
- Pain control

## 2018-01-06 NOTE — CT SCAN REPORT
EXAMINATION:
CT HEAD WITHOUT CONTRAST
CT CERVICAL SPINE WITHOUT CONTRAST
XR CHEST, ONE VIEW
 
CLINICAL INFORMATION:
Fall, head injury, loss of consciousness. Status post laminectomy.
 
COMPARISON:
MRI brain and cervical spine dated 09/27/2017. Chest radiograph dated
09/25/2017.
 
TECHNIQUE:
Contiguous axial imaging was performed from the skull base to vertex without
intravenous administration of contrast.
Multidetector helical imaging was performed through the cervical spine.
Coronal and sagittal reformats were completed at the technologist
workstation.
 
DLP:
1037 mGy-cm.
 
In addition an AP portable chest radiograph was performed.
 
FINDINGS:
 
HEAD:
There is no evidence of acute intracranial hemorrhage or territorial
infarction. No abnormal mass effect or midline shift is seen. Cortical gray
to white matter differentiation is well preserved without evidence of
territorial infarct. No extra-axial fluid collections are identified.
 
No hydrocephalus. Left frontal scalp contusion is noted without underlying
fracture. The mastoid air cells and visualized portions of the paranasal
sinuses are well aerated.
 
CERVICAL SPINE:
No acute fracture or dislocation is identified in the cervical spine.
Bilateral C6 laminectomies are noted. There are multilevel degenerative
changes characterized by diffuse loss of intervertebral disc space height,
endplate irregularity, and multilevel disc osteophyte complex formation.
Vertebral body heights are maintained. The atlantoaxial articulation is
normally maintained. No prevertebral soft tissue swelling. The lung apices
are clear.
 
Chest x-ray:
The lungs are hypoexpanded with associated central bronchovascular
prominence. Streaky left basal opacities are noted. Cardiac mediastinal
silhouette appears stable. No pneumothorax. No acute osseous abnormalities.
 
IMPRESSION:
1. Left frontal scalp contusion. No acute intracranial pathology.
2. No evidence of acute cervical spine traumatic injury. Multilevel
degenerative change with bilateral C6 laminectomies.
3. Hypoexpanded lungs with central bronchovascular prominence. Streaky left
basal opacity which is nonspecific and may represent subsegmental
atelectasis.

## 2018-01-06 NOTE — ED MVC/FALL/TRAUMA COMPLAINT
History of Present Illness
 
General
Chief Complaint: Fall
Stated Complaint: FALL, HIT HEAD, LAC TO LEFT EYEBROW
Source: patient
Exam Limitations: no limitations
 
Vital Signs & Intake/Output
Vital Signs & Intake/Output
 Vital Signs
 
 
Date Time Temp Pulse Resp B/P B/P Pulse O2 O2 Flow FiO2
 
     Mean Ox Delivery Rate 
 
 0136 98.5 93 20 150/72  94 Room Air  
 
 
 
Allergies
Coded Allergies:
lactose (GI UPSET 17)
 
Reconcile Medications
Aspirin (Ecotrin*) 81 MG TABLET.DR   1 TAB PO DAILY PROPHO  (Reported)
Docusate Sodium 100 MG CAPSULE   100 MG PO BID PRN CONSTIPATION
     STOOL SOFTENER, AVAILABLE OVER THE COUNTER
Duloxetine HCl 60 MG CAPSULE.DR   1 CAP PO DAILY DEPRESSION  (Reported)
Ferrous Sulfate 325 MG (65 MG IRON) TABLET   1 TAB PO DAILY PRE OP  (Reported)
Fluticasone Propionate (Flonase Allergy Relief) 50 MCG/ACTUATION SPRAY.SUSP 
NASAL CONGESTION  (Reported)
Glycopyrrolate/Formoterol Fum (Bevespi Aerosphere Inhaler) 9 MCG-4.8 MCG 
HFA.AER.AD   2 PUFF PO BID COPD  (Reported)
Mirtazapine 15 MG TABLET   1 TAB PO QPM SLEEP HELP  (Reported)
Rosuvastatin Calcium (Crestor) 10 MG TABLET   1 TAB PO DAILY CHOLESTEROL  (
Reported)
 
Triage Nurses Notes Reviewed? yes
Onset: Abrupt
Duration: minute(s):
Timing: single episode today
Severity: mild
Injuries/Fall Location: head
Method of Injury: fall
Loss of Consciousness: prolonged (minutes)
Modifying Factors:
Improves With: rest. 
Associated Symptoms: head injury, +LOC
HPI:
73 yo gentleman
in prior good health
presents with a syncopal episode and a fall.
 
Per his wife, he was in bed, got up to go to the kitchen for a cookie.  She 
heard a loud bang and found him unconscious on the floor.  He was unconcious for
few minutes and then slowly aroused.  He was confused for several minutes 
afterwards.
 
He has no recollection of the event - whether he had syncopal symptoms, 
palpitations, chest pain.
 
He notes that he hit his left forehead likely on a counter or table.  He is 
uncertain if he passed out from the blow or previously.
 
He notes that he is feeling better presently. 
 
Past History
 
Travel History
Traveled to [place] past 21 day No
 
Medical History
Any Pertinent Medical History? see below for history
Neurological: NONE
EENT: NONE
Cardiovascular: HYPERCHOLESTERMIA
Respiratory: COPD
Gastrointestinal: NONE
Hepatic: NONE
Renal: NONE
Musculoskeletal: degen joint disease
Psychiatric: NONE
Endocrine: NONE
Blood Disorders: NONE
Cancer(s): prostate cancer, SKIN CA
GYN/Reproductive: NONE
History of MRSA: No
History of VRE: No
History of CDIFF: No
 
Surgical History
Surgical History: TONSILLECTOMY PROSTATECTOMY ULNAR NERVE REPAIR SKIN CA 
EXCISION
 
Psychosocial History
Who do you live with Spouse
What is your primary language English
 
Family History
Hx Contributory? No
 
Review of Systems
 
Review of Systems
Constitutional:
Reports: no symptoms. 
Eyes:
Reports: no symptoms. 
Ears, Nose, Throat, Mouth:
Reports: no symptoms. 
Respiratory:
Reports: no symptoms. 
Cardiovascular:
Reports: no symptoms. 
Gastrointestinal/Abdominal:
Reports: no symptoms. 
Genitourinary:
Reports: no symptoms. 
Musculoskeletal:
Reports: no symptoms. 
Skin:
Reports: no symptoms. 
Neurological/Psychological:
Reports: no symptoms. 
All Other Systems: Reviewed and Negative
 
Physical Exam
 
Physical Exam
General Appearance: well developed/nourished, mild distress
Head: let periorbit with 2cm vertial laceration, well approximated, non tender, 
no sign of infection. 
Eyes:
Bilateral: normal appearance, PERRL, EOMI. 
Ears, Nose, Throat, Mouth: hearing grossly normal, moist mucous membrane
Neck: normal inspection, supple, full range of motion
Respiratory: normal breath sounds, chest non-tender, no respiratory distress, 
quiet respiration, lungs clear
Cardiovascular: regular rate/rhythm
Gastrointestinal: normal bowel sounds, soft, non-tender
Back: normal inspection, normal range of motion
Extremities: normal range of motion
Neurologic/Psych: no motor/sensory deficits, awake, alert, oriented x 3
Skin: intact, normal color, warm/dry
 
Core Measures
ACS in differential dx? No
CVA/TIA Diagnosis No
Sepsis Present: No
Sepsis Focused Exam Completed? No
 
Progress
Differential Diagnosis: dysrhythmia vs other. 
Plan of Care:
 Orders
 
 
Procedure Date/time Status
 
Nothing by Mouth  B Active
 
EKG  0355 Active
 
Patient Data 347 Active
 
Saline Lock 344 Active
 
Place in observation 344 Active
 
Misc Message 344 Active
 
ED Holding Orders 344 Active
 
Vital Signs 344 Active
 
Code Status 344 Active
 
EKG  0253 Active
 
TROPONIN LEVEL  014 Complete
 
COMPREHENSIVE METABOLIC PANEL 148 Complete
 
CBC WITHOUT DIFFERENTIAL 148 Complete
 
 
 Current Medications
 
 
  Sig/Ariadna Start time  Last
 
Medication Dose  Stop Time Status Admin
 
Tetanus/Diphtheria  0.5 ML ONCE  UNVr 
 
Toxoids Adsorbed   416  
 
(Decavac)     
 
 
 Laboratory Tests
 
 
188:
Anion Gap 10, Estimated GFR > 60, BUN/Creatinine Ratio 26.7  H, Glucose 119  H, 
Calcium 9.3, Total Bilirubin 0.4, AST 39, ALT 44, Alkaline Phosphatase 63, 
Troponin I < 0.01, Total Protein 6.1  L, Albumin 3.7, Globulin 2.4, Albumin/
Globulin Ratio 1.5, CBC w Diff NO MAN DIFF REQ, RBC 5.40, MCV 87.7, MCH 28.0, 
RDW 15.7  H, MPV 8.4, Gran % 80.2  H, Lymphocytes % 12.2  L, Monocytes % 5.9, 
Eosinophils % 1.2, Basophils % 0.5, Absolute Granulocytes 11.3  H, Absolute 
Lymphocytes 1.7, Absolute Monocytes 0.8  H, Absolute Eosinophils 0.2, Absolute 
Basophils 0.1, PUBS MCHC 32.0  L
 
Diagnostic Imaging:
Viewed by Me: Radiology Read, CT Scan.  Discussed w/RAD: Radiology Read, CT 
Scan. 
Radiology Impression: head ct... no acute disease PATIENT: MICAH TRISTAN JR  
MEDICAL RECORD NO: [id number] PRESENT AGE: 72  PATIENT ACCOUNT NO: [id number] :   LOCATION: [facility] ORDERING PHYSICIAN: Irwin Munoz MD     SERVICE 
DATE:  EXAM TYPE: CAT - CT CERV SPINE WO IV CONTRAST; CT HEAD WO IV
CONTRAST; XRY-PORTABLE CHEST XRAY EXAMINATION: CT HEAD WITHOUT CONTRAST CT 
CERVICAL SPINE WITHOUT CONTRAST XR CHEST, ONE VIEW CLINICAL INFORMATION: Fall, 
head injury, loss of consciousness. Status post laminectomy. COMPARISON: MRI 
brain and cervical spine dated 2017. Chest radiograph dated 2017. 
TECHNIQUE: Contiguous axial imaging was performed from the skull base to vertex 
without intravenous administration of contrast. Multidetector helical imaging 
was performed through the cervical spine. Coronal and sagittal reformats were 
completed at the technologist workstation. DLP: 1037 mGy-cm. In addition an AP 
portable chest radiograph was performed. FINDINGS: HEAD: There is no evidence of
acute intracranial hemorrhage or territorial infarction. No abnormal mass effect
or midline shift is seen. Cortical gray to white matter differentiation is well 
preserved without evidence of territorial infarct. No extra-axial fluid 
collections are identified. No hydrocephalus. Left frontal scalp contusion is 
noted without underlying fracture. The mastoid air cells and visualized portions
of the paranasal sinuses are well aerated. CERVICAL SPINE: No acute fracture or 
dislocation is identified in the cervical spine. Bilateral C6 laminectomies are 
noted. There are multilevel degenerative changes characterized by diffuse loss 
of intervertebral disc space height, endplate irregularity, and multilevel disc 
osteophyte complex formation. Vertebral body heights are maintained. The 
atlantoaxial articulation is normally maintained. No prevertebral soft tissue 
swelling. The lung apices are clear. Chest x-ray: The lungs are hypoexpanded 
with associated central bronchovascular prominence. Streaky left basal opacities
are noted. Cardiac mediastinal silhouette appears stable. No pneumothorax. No 
acute osseous abnormalities. IMPRESSION: 1. Left frontal scalp contusion. No 
acute intracranial pathology. 2. No evidence of acute cervical spine traumatic 
injury. Multilevel degenerative change with bilateral C6 laminectomies. 3. 
Hypoexpanded lungs with central bronchovascular prominence. Streaky left basal 
opacity which is nonspecific and may represent subsegmental atelectasis. 
DICTATED BY: Dmitry Boone MD  DATE/TIME DICTATED:18 
:RAD.JOHNSON  DATE/TIME TRANSCRIBED:18 CONFIDENTIAL, 
DO NOT COPY WITHOUT APPROPRIATE AUTHORIZATION.  <Electronically signed in Other 
Vendor System>                                                                  
                     SIGNED BY: Dmitry Boone MD 18
CXR Impression: sub segmental atelectasis
Initial ED EKG: non specific t wave changes. 
 
Departure
 
Departure
Disposition: STILL A PATIENT
Condition: Stable
Clinical Impression
Primary Impression: Syncope
Secondary Impressions: Facial laceration, Head injury
Referrals:
Seble Hull MD (PCP/Family)
 
Departure Forms:
Customer Survey
General Discharge Information
 
Observation Note
Spoke With:
James Mcgregor MD
Physician Advisor Notified: RACHANA RODRÍGUEZ,ISABELA RAMSEY
Place Patient In: Non-ED OBS Care Area
Rationale for Observation:
My rational for observation is as follows .
 
pt with syncopal episode, likely of multifactorial etiology.  pt recently on 
remeron, also with diaphragm issues.  Also on the diff dx is dysrhythmia.  Pt 
merits serial trops/ekg and monitoring. 
 
 
Procedures
 
Laceration/Wound Repair
Laceration/Wound Repair:
   Wound Location: face
   Wound's Depth, Shape: irregular, into muscle
   Wound Length (cm): 2
   Irrigated w/ Saline (ccs): 100
   Wound Repaired With: Steri-strips, Dermabond

## 2018-01-07 VITALS — DIASTOLIC BLOOD PRESSURE: 64 MMHG | SYSTOLIC BLOOD PRESSURE: 112 MMHG

## 2018-01-07 VITALS — DIASTOLIC BLOOD PRESSURE: 88 MMHG | SYSTOLIC BLOOD PRESSURE: 142 MMHG

## 2018-01-07 VITALS — SYSTOLIC BLOOD PRESSURE: 150 MMHG | DIASTOLIC BLOOD PRESSURE: 82 MMHG

## 2018-01-07 LAB
ABSOLUTE GRANULOCYTE CT: 6.9 /CUMM (ref 1.4–6.5)
BASOPHILS # BLD: 0.1 /CUMM (ref 0–0.2)
BASOPHILS NFR BLD: 0.5 % (ref 0–2)
EOSINOPHIL # BLD: 0.3 /CUMM (ref 0–0.7)
EOSINOPHIL NFR BLD: 2.9 % (ref 0–5)
ERYTHROCYTE [DISTWIDTH] IN BLOOD BY AUTOMATED COUNT: 15.1 % (ref 11.5–14.5)
GRANULOCYTES NFR BLD: 70.5 % (ref 42.2–75.2)
HCT VFR BLD CALC: 46.2 % (ref 42–52)
LYMPHOCYTES # BLD: 1.8 /CUMM (ref 1.2–3.4)
MCH RBC QN AUTO: 28.6 PG (ref 27–31)
MCHC RBC AUTO-ENTMCNC: 32.5 G/DL (ref 33–37)
MCV RBC AUTO: 87.8 FL (ref 80–94)
MONOCYTES # BLD: 0.8 /CUMM (ref 0.1–0.6)
PLATELET # BLD: 253 /CUMM (ref 130–400)
PMV BLD AUTO: 8.9 FL (ref 7.4–10.4)
RED BLOOD CELL CT: 5.26 /CUMM (ref 4.7–6.1)
WBC # BLD AUTO: 9.8 /CUMM (ref 4.8–10.8)

## 2018-01-07 NOTE — CONS- CARDIOLOGY
General Information and HPI
 
Consulting Request
Date of Consult: 01/07/18
Requested By:
James Mcgregor MD
 
History of Present Illness:
Mr. Santiago is 72 year old male with history of dyslipidemia and COPD. He 
presented to the ER following a syncopal episode. He had arisen from bed and 
walked to the kitchen to eat a cookie and suddenly passed out after taking a 
bite with a laceration over the left eye. He was out for about 10 seconds before
regaining consciousness although he was not-completely lucid until after coming 
to the ER. The patient denies feeling any palpitations during this event or at 
any time. He has never felt lightheaded or fainted before. He also denies chest 
pain, pressure, tightness or shortness of breath at rest. 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 There 
were no tonic-clonic movements observed by his wife and the patient was not 
incontinent.
 
 
Allergies/Medications
Allergies:
Coded Allergies:
lactose (GI UPSET 08/07/17)
 
Home Med List:
Albuterol Sulfate (Proair Hfa) 90 MCG HFA.AER.AD   2 PUF INH Q4-6 PRN PRN copd  
(Reported)
Docusate Sodium 100 MG CAPSULE   100 MG PO BID PRN CONSTIPATION
     STOOL SOFTENER, AVAILABLE OVER THE COUNTER
Duloxetine HCl 60 MG CAPSULE.DR   1 CAP PO DAILY DEPRESSION  (Reported)
Fluticasone Propionate (Flonase Allergy Relief) 50 MCG/ACTUATION SPRAY.SUSP 
nasal congestion  (Reported)
Glycopyrrolate/Formoterol Fum (Bevespi Aerosphere Inhaler) 9 MCG-4.8 MCG 
HFA.AER.AD   2 PUFF PO BID COPD  (Reported)
Mirtazapine 15 MG TABLET   1 TAB PO QPM SLEEP HELP  (Reported)
Rosuvastatin Calcium (Crestor) 10 MG TABLET   1 TAB PO DAILY CHOLESTEROL  (
Reported)
 
 
Review of Systems
Review of Systems:
left arm weakness
 
Past History
 
Travel History
Traveled to [place] past 21 day No
 
Medical History
Blood Transfusion Hx: No
Neurological: NONE
EENT: NONE
Cardiovascular: HYPERCHOLESTERMIA
Respiratory: COPD
Gastrointestinal: NONE
Hepatic: NONE
Renal: NONE
Musculoskeletal: degen joint disease
Psychiatric: NONE
Endocrine: NONE
Blood Disorders: NONE
Cancer(s): prostate cancer, SKIN CA
GYN/Reproductive: NONE
 
Surgical History
Surgical History: TONSILLECTOMY PROSTATECTOMY ULNAR NERVE REPAIR SKIN CA 
EXCISION
 
Psychosocial History
Where Do You Live? Home
Smoking Status: Former Smoker
 
Exam & Diagnostic Data
Vital Signs and I&O
Vital Signs
 
 
Date Time Temp Pulse Resp B/P B/P Pulse O2 O2 Flow FiO2
 
     Mean Ox Delivery Rate 
 
01/07 1450 98.1 108 20 112/64  92 Nasal 2.0L 
 
       Cannula  
 
01/07 1437       Nasal 2.0L 
 
       Cannula  
 
01/07 0800       Nasal 2.0L 
 
       Cannula  
 
01/07 0630 98.5 83 20 142/88  94   
 
01/06 2305       Nasal 2.0L 
 
       Cannula  
 
01/06 2230 98.5 92 20 118/64  92 Nasal 2.0L 
 
       Cannula  
 
 
 Intake & Output
 
 
 01/07 1600 01/07 0800 01/07 0000 01/06 1600 01/06 0800 01/06 0000
 
Intake Total 1300 500 300   
 
Output Total      
 
Balance 1300 500 300   
 
       
 
Intake,  500 300   
 
Intake, Oral 500     
 
Number 1     
 
Bowel      
 
Movements      
 
Patient   162 lb  162 lb 
 
Weight      
 
Weight   Bed scale  Reported by Patient 
 
Measurement      
 
Method      
 
 
 
Physical Exam:
General: WD/WN male in NAD; alert and oriented x 3
HEENT: NC/AT, PERRL, EOMI
Neck: no JVD, no carotid bruit
Heart: RRR with 2/6 systolic murmur
Lungs: clear bilaterally
Abdomen: soft, NT, +ve bowel sounds
Extremities: no edema
 
Assessment/Plan
Assessment/Plan
* This patient has an ECG that shows abnormal R wave progression consistent with
an old anterolateral wall MI. In this setting the patient may have had a 
malignant ventricular dysrhythmia. A seizure cannot be unequivocally exluded but
is not felt likely by neurology. The patient does have risk factors for 
myocardial ischemia. Monitor on telemetry and obtain an echocardiogram to assess
his overall EF and to look for regional wall motion abnormalities. Add 
metoprolol 12.5mg BID, aspirin 81mg daily and a statin. 
 
 
Consult Acknowledgment
- Thank you for your consult request.

## 2018-01-07 NOTE — PN- ATT ADDEND
Attending Addendum
Attending Brief Note
S: The patient was seen and discussed with house staff/wife/nursing/case 
management. He still feels slightly weak, however improving. No headache. Wife 
states he was definitely confused yesterday and is improving, however not back 
to baseline. He is amnestic of events that lead to admission. 
 
O:
VS:
 Vital Signs
 
 
Date Time Temp Pulse Resp B/P B/P Pulse O2 O2 Flow FiO2
 
     Mean Ox Delivery Rate 
 
01/07 1450 98.1 108 20 112/64  92 Nasal 2.0L 
 
       Cannula  
 
01/07 1437       Nasal 2.0L 
 
       Cannula  
 
01/07 0800       Nasal 2.0L 
 
       Cannula  
 
01/07 0630 98.5 83 20 142/88  94   
 
01/06 2305       Nasal 2.0L 
 
       Cannula  
 
01/06 2230 98.5 92 20 118/64  92 Nasal 2.0L 
 
       Cannula  
 
 
 Intake & Output
 
 
 01/07 1600 01/07 0800 01/07 0000
 
Intake Total 1300 500 300
 
Output Total   
 
Balance 1300 500 300
 
    
 
Intake,  500 300
 
Intake, Oral 500  
 
Number 1  
 
Bowel   
 
Movements   
 
Patient   162 lb
 
Weight   
 
Weight   Bed scale
 
Measurement   
 
Method   
 
 
 Current Medications
 
 
  Sig/Ariadna Start time  Last
 
Medication Dose Route Stop Time Status Admin
 
Acetaminophen 650 MG Q6P PRN 01/06 0515 AC 
 
  PO   
 
Albuterol Sulfate 3 ML BID 01/07 1435 AC 01/07
 
  INH   1436
 
Albuterol Sulfate 2 PUF Q4-6 PRN PRN 01/06 0530 AC 
 
  INH   
 
Atorvastatin Calcium 40 MG 1700 01/06 1700 AC 01/07
 
  PO   1722
 
Budesonide/ 2 PUF BID 01/06 1100 AC 01/07
 
Formoterol Fumarate  INH   0831
 
Duloxetine HCl 60 MG DAILY 01/06 1000 AC 01/07
 
  PO   0829
 
Enoxaparin Sodium 40 MG DAILY 01/06 1000 AC 01/07
 
  SC   0831
 
Fluticasone  2 SPRAY DAILY PRN 01/06 0530 AC 
 
Propionate  CONCEPCION   
 
Sodium Chloride 1,000 ML Q10H 01/06 1430 AC 01/07
 
  IV   0231
 
Tiotropium [place] 1 PUF DAILY 01/06 1100 AC 01/07
 
  INH   0830
 
 
Physical Exam:
Physical Exam:
HEENT: john- dry mucosa (improved)
Neck: no JVD/bruits
Chest: clear
Cor: sl tachy, reg, nl S1, S2 w/o murm
Abd: BS+, soft, NT
Ext: no edema
Neuro: alert & oriented, non-focal
 
Labs:
 Laboratory Tests
 
01/07/18 0625:
Anion Gap 7, Estimated GFR > 60, BUN/Creatinine Ratio 21.7, CBC w Diff NO MAN 
DIFF REQ, RBC 5.26, MCV 87.8, MCH 28.6, RDW 15.1  H, MPV 8.9, Gran % 70.5, 
Lymphocytes % 18.3  L, Monocytes % 7.8, Eosinophils % 2.9, Basophils % 0.5, 
Absolute Granulocytes 6.9  H, Absolute Lymphocytes 1.8, Absolute Monocytes 0.8  
H, Absolute Eosinophils 0.3, Absolute Basophils 0.1, PUBS MCHC 32.5  L
 
Impression/Plan:
#S/P Syncope- concerned regarding lack of clear etiology. Neurology input 
appreciated. Still await cardiology input. No arrhythmias noted (only sinus 
tachycardia). HR decreased today. 
Plan: Will ambulate and continue to follow. 
 
#Hypoxic Respiratory Failure- patient is followed for diaphragmatic paralysis by
Dr. Dozier, however has been noted to be hypoxic since admit. Suspect will need 
home oxygen. Note, D-dimer was negative on admission.
Plan: Overnight pulse ox tonight (discussed with Dr. Alicia who will arrange) 
and will determine need for oxygen at home. Will convert to full admission as 
this needs to be evaluated and may be related to syncopal event. 
 
#Volume Depletion- patient appears slightly better hydrated post IV fluids. 
Endorses that he has not been drinking liquids at home.
Plan: Encourage po intake.
 
#COPD- on inhalers.
Plan: Continue Inhalers.
 
#Hyperlipidemia- on Atorvastatin.
 
Discussed with case management and will convert to full admission. 
Plan: Continue Atorvastatin.

## 2018-01-08 VITALS — DIASTOLIC BLOOD PRESSURE: 70 MMHG | SYSTOLIC BLOOD PRESSURE: 125 MMHG

## 2018-01-08 VITALS — SYSTOLIC BLOOD PRESSURE: 128 MMHG | DIASTOLIC BLOOD PRESSURE: 72 MMHG

## 2018-01-08 VITALS — DIASTOLIC BLOOD PRESSURE: 80 MMHG | SYSTOLIC BLOOD PRESSURE: 120 MMHG

## 2018-01-08 LAB
ABSOLUTE GRANULOCYTE CT: 6.4 /CUMM (ref 1.4–6.5)
BASOPHILS # BLD: 0 /CUMM (ref 0–0.2)
BASOPHILS NFR BLD: 0.5 % (ref 0–2)
EOSINOPHIL # BLD: 0.3 /CUMM (ref 0–0.7)
EOSINOPHIL NFR BLD: 3.7 % (ref 0–5)
ERYTHROCYTE [DISTWIDTH] IN BLOOD BY AUTOMATED COUNT: 15.1 % (ref 11.5–14.5)
GRANULOCYTES NFR BLD: 67.9 % (ref 42.2–75.2)
HCT VFR BLD CALC: 43 % (ref 42–52)
LYMPHOCYTES # BLD: 1.8 /CUMM (ref 1.2–3.4)
MCH RBC QN AUTO: 28.6 PG (ref 27–31)
MCHC RBC AUTO-ENTMCNC: 32.8 G/DL (ref 33–37)
MCV RBC AUTO: 87.1 FL (ref 80–94)
MONOCYTES # BLD: 0.9 /CUMM (ref 0.1–0.6)
PLATELET # BLD: 226 /CUMM (ref 130–400)
PMV BLD AUTO: 8.8 FL (ref 7.4–10.4)
RED BLOOD CELL CT: 4.94 /CUMM (ref 4.7–6.1)
WBC # BLD AUTO: 9.4 /CUMM (ref 4.8–10.8)

## 2018-01-08 NOTE — PATIENT DISCHARGE INSTRUCTIONS
Discharge Instructions
 
General Discharge Information
You were seen/treated for:
Syncopal attack
Special Instructions:
-Please follow-up with your primary care physician within 1 week after discharge
-Please follow-up with cardiologist Dr. Aguilera after discharge
-Please follow-up with your pulmonologist Alex Dozier MD after discharge
-Please see an ENT doctor after discharge
-Please take your steroid as directed
-Please use the oxygen 2 L during the day and 4L at night while sleeping 
 
 
Acute Coronary Syndrome
 
Inclusion Criteria
At DC or during hospital stay patient has or had the following:
 
Discharge Core Measures
Meds if any: Prescribed or Continued at Discharge
Meds if any: NOT Prescribed or Continued at Discharge
 
Congestive Heart Failure
 
Inclusion Criteria
At DC or during hospital stay patient has or had the following:
 
Discharge Core Measures
Meds if any: Prescribed or Continued at Discharge
Meds if any: NOT Prescribed or Continued at Discharge
 
Cerebrovascular accident
 
Inclusion Criteria
At DC or during hospital stay patient has or had the following:
CVA/TIA Diagnosis No
 
Discharge Core Measures
Meds if any: Prescribed or Continued at Discharge
Meds if any: NOT Prescribed or Continued at Discharge
 
Venous thromboembolism
 
Discharge Core Measures
- Per Current guidelines, there needs to be overlap
- treatment for the first 5 days of Warfarin therapy.
- If discharged on Warfarin prior to 5 days of
- overlap therapy, the patient will need to be
- assessed for post discharge needs including
- *Post discharge parental anticoagulation
- *Warfarin and/or parental anticoagulation education
- *Follow up date to check INR post discharge
Meds if any: Prescribed or Continued at Discharge
Note: Overlap Therapy is Warfarin and Anticoagulant
Meds if any: NOT Prescribed or Continued at Discharge

## 2018-01-08 NOTE — CONS- PULMONARY
General Information and HPI
 
Consulting Request
Date of Consult: 01/08/18
Requested By:
med team
 
History of Present Illness:
Mr. Santiago is 72 year old male with chief complain of syncopal episode and 
fall.  Patient has past medical history significant for hyperlipidemia, 
degenerative disc disease with severe central canal stenosis and cord 
compression status post surgical decompression in November 2017, osteoarthritis 
of right knee status post knee replacement June 2017, prostate cancer status 
post excision 2013, skin cancer. 
 
He was recently seen by me for a  preop eval for cervical spine surg as he had 
severe djd with pending quadrepersis at that time prelim investgation had 
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 As 
he had severe djd with pending quadreplegia he was cleared for surg and he was 
to follow with me soon for eval of his mixed obstructive and restrictive lung 
disease.
 
His pulm issues and other probs include
 
 
 

atx
 
 Cervical disc disease / sig path/ s/p surg as he had quadreperesis
 
 Atelectasis bibasilar due to cervical dz causeing diapharm dysfunction prob 
needs eval
 
 COPD fev1 of 1.49 litres 
 
 History of smoking 30 or more pack years quit 2016
 
 Personal history of malignant neoplasm of prostate s/p surg needs follw up
 
 Mixed restrictive and obstructive lung disease 
 
 Right knee DJD / s/p surg
 
 Hyperlipidemia
 
 Abnormal ECG followed by cardio
 
 SOB (shortness of breath) stable
 
 
 
 
 
Allergies/Medications
Allergies:
Coded Allergies:
lactose (GI UPSET 08/07/17)
 
Home Med List:
Albuterol Sulfate (Proair Hfa) 90 MCG HFA.AER.AD   2 PUF INH Q4-6 PRN PRN copd  
(Reported)
Docusate Sodium 100 MG CAPSULE   100 MG PO BID PRN CONSTIPATION
     STOOL SOFTENER, AVAILABLE OVER THE COUNTER
Duloxetine HCl 60 MG CAPSULE.DR   1 CAP PO DAILY DEPRESSION  (Reported)
Fluticasone Propionate (Flonase Allergy Relief) 50 MCG/ACTUATION SPRAY.SUSP 
nasal congestion  (Reported)
Glycopyrrolate/Formoterol Fum (Bevespi Aerosphere Inhaler) 9 MCG-4.8 MCG 
HFA.AER.AD   2 PUFF PO BID COPD  (Reported)
Mirtazapine 15 MG TABLET   1 TAB PO QPM SLEEP HELP  (Reported)
Rosuvastatin Calcium (Crestor) 10 MG TABLET   1 TAB PO DAILY CHOLESTEROL  (
Reported)
 
 
Review of Systems
 
Review of Systems
Constitutional:
Reports: see HPI. 
 
Past History
 
Travel History
Traveled to [place] past 21 day No
 
Medical History
Blood Transfusion Hx: No
Neurological: NONE
EENT: NONE
Cardiovascular: HYPERCHOLESTERMIA
Respiratory: COPD
Gastrointestinal: NONE
Hepatic: NONE
Renal: NONE
Musculoskeletal: degen joint disease
Psychiatric: NONE
Endocrine: NONE
Blood Disorders: NONE
Cancer(s): prostate cancer, SKIN CA
GYN/Reproductive: NONE
 
Surgical History
Surgical History: TONSILLECTOMY PROSTATECTOMY ULNAR NERVE REPAIR SKIN CA 
EXCISION
 
Psychosocial History
Where Do You Live? Home
Smoking Status: Former Smoker
 
Exam & Diagnostic Data
Last 24 Hrs of Vital Signs/I&O
 Vital Signs
 
 
Date Time Temp Pulse Resp B/P B/P Pulse O2 O2 Flow FiO2
 
     Mean Ox Delivery Rate 
 
01/08 1600       Nasal 2.0L 
 
       Cannula  
 
01/08 1453 98.1 90 20 128/72  95 Nasal 2.0L 
 
       Cannula  
 
01/08 1233      95 Nasal 2.0L 
 
       Cannula  
 
01/08 0800       Nasal 2.0L 
 
       Cannula  
 
01/08 0634 98.1 81 20 120/80  93 Nasal 3.0L 
 
       Cannula  
 
01/07 2220 98.1 98 20 150/82  91 Nasal  
 
       Cannula  
 
01/07 2141       Nasal 2.0L 
 
       Cannula  
 
01/07 1954      93 Nasal 2.0L 
 
       Cannula  
 
 
 Intake & Output
 
 
 01/08 1600 01/08 0800 01/08 0000
 
Intake Total 1400 1175 300
 
Output Total   
 
Balance 1400 1175 300
 
    
 
Intake,  800 
 
Intake, Oral 800 375 300
 
Number 1  
 
Bowel   
 
Movements   
 
 
 
Last 48 Hrs of Labs/Ned:
 Laboratory Tests
 
01/08/18 0640:
Anion Gap 9, Estimated GFR > 60, BUN/Creatinine Ratio 16.7, CBC w Diff NO MAN 
DIFF REQ, RBC 4.94, MCV 87.1, MCH 28.6, RDW 15.1  H, MPV 8.8, Gran % 67.9, 
Lymphocytes % 18.6  L, Monocytes % 9.3, Eosinophils % 3.7, Basophils % 0.5, 
Absolute Granulocytes 6.4, Absolute Lymphocytes 1.8, Absolute Monocytes 0.9  H, 
Absolute Eosinophils 0.3, Absolute Basophils 0, PUBS MCHC 32.8  L
 
01/07/18 0625:
Anion Gap 7, Estimated GFR > 60, BUN/Creatinine Ratio 21.7, CBC w Diff NO MAN 
DIFF REQ, RBC 5.26, MCV 87.8, MCH 28.6, RDW 15.1  H, MPV 8.9, Gran % 70.5, 
Lymphocytes % 18.3  L, Monocytes % 7.8, Eosinophils % 2.9, Basophils % 0.5, 
Absolute Granulocytes 6.9  H, Absolute Lymphocytes 1.8, Absolute Monocytes 0.8  
H, Absolute Eosinophils 0.3, Absolute Basophils 0.1, PUBS MCHC 32.5  L
 
 
Assessment/Plan
Impression/Plan:
ULtrasound of lower ext showed no dvt
 
CTA
CHEST:
 
Lungs: There is mild to moderate centrilobular emphysema within the upper
lobes. There are bibasilar areas of peripheral consolidation, left greater
than right which demonstrate the "CT angiogram sign". The area on the left is
large measuring approximately up to 12 x 5 cm. The airways appear patent to
the subsegmental level. No pleural effusion.
 
Mediastinum: Incidental note made of a bovine arch. Thyroid gland is within
normal limits. Mediastinal lymph nodes measure up to 0.8 cm in short axis.
 
Pleura: There is no pleural effusion or pleural-based mass.
 
Axilla/Chest Wall: The chest wall is normal in appearance. There are no
pathologically enlarged lymph nodes.
 
UPPER ABDOMEN: The imaged portions of the upper abdominal solid viscera are
normal in appearance.
 
OSSEOUS STRUCTURES: Intact and normal in appearance. Minimal diffuse
degenerative changes throughout the visualized thoracic spine.
 
IMPRESSION:
1. No acute pulmonary embolus.
2. Bibasilar enhancing consolidations. The differential diagnosis for this
specific imaging appearance includes pneumonia, post obstructive pneumonitis
secondary to central lung tumor (not visualized on the current study),
lymphoma versus metastasis from gastrointestinal carcinoma.
 
DICTATED BY: Ameena Rasmussen MD 
DATE/TIME DICTATED:01/08/18 / 1622
 
 
 
Physical Exam
General Appearance Alert, Oriented X3, Cooperative, No Acute Distress, 
frustrated
Skin No Rashes
Skin Temp/Moisture Exam: Warm/Dry
HEENT Atraumatic, PERRLA, EOMI, Mucous Membr. moist/pink
Neck Supple
Lymphatic No cervical lymphadenopathy 
Cardiovascular Regular Rate, Normal S1, Normal S2, No Murmurs
Lungs Clear to Auscultation, Decrease air entery of left lung 
Abdomen Normal Bowel Sounds, Soft, No Tenderness
Neurological Normal Speech, Strength at 5/5 X4 Ext, Normal Tone, Sensation 
Intact, Cranial Nerves 3-12 NL, Hyperreflexia x4 clonus fasculation 
Extremities No Clubbing, No Cyanosis, No Edema, Normal Pulses
 
IMPRESSION
 
PT with recent extensive cervical spine surg for severe djd, previous left sided
diapharam paralysis with history of sig smoking, COPD with mixed obstructive 
restrictive lung disease, syncope, with 
 
* Hypoxia due to bilateral lower lobe pulm contrast enhancing infiltrates highly
sugg of resolving chronic atelectasis from his diaphramatic paralysis especially
in the left side. However RACIEL/ lymphoma etc is a possiblitiy but less likely, 
and needs eval as out pt with PET CT etc
 
* Cervical disc disease /  s/p surg as he had quadreperesisbefore with good 
recovery of function
 
* Atelectasis bibasilar due to cervical dz causeing diapharm dysfunction/needs 
eval
 
* COPD fev1 of 1.49 litres, now no evidence of copde
 
* History of smoking 30 or more pack years quit 2016
 
* Personal history of malignant neoplasm of prostate s/p surg needs follw up
 
* Mixed restrictive and obstructive lung disease 
 
REC
COnt all the investigations for syncope
Check room air sat and if more than 90 can go home on room air
cont out pt inhaler rx
Will arrange for a pet scan as out pt
Check PSA as he has had prostate cancer before
Will follow
PS I have not discussed the CT findings with the patient yet and will do so in 
am
 
 
Consult Acknowledgment
- Thank you for your consult request.

## 2018-01-08 NOTE — ULTRASOUND REPORT
EXAMINATION:
US TRIPLEX OF LOWER EXTREMITIES, BILATERAL
 
CLINICAL INFORMATION:
Bilateral lower extremity edema and hypoxia.
 
COMPARISON:
None
 
TECHNIQUE:
Color-flow triplex imaging with spectral analysis and compression Doppler
were performed on the lower extremities.
 
FINDINGS:
Respiratory variation, normal compression and augmented flow are noted
throughout the lower extremities. The visualized common femoral vein,
superficial femoral vein, profunda femoral vein, popliteal vein and midcalf
peroneal and posterior tibial venous segments show no evidence of deep
venous thrombosis.
 
There is edema within the subcutaneous fat.
 
A Baker's cyst is incidentally noted in the left popliteal fossa and measures
2.5 x 1.2 x 2.0 cm.
 
IMPRESSION:
1.  Normal triplex scan without evidence of deep venous thrombosis involving
either leg.
2.  Incidentally noted Baker's cyst in the left popliteal fossa measuring 2.5
cm.

## 2018-01-08 NOTE — DISCHARGE SUMMARY
Hospital Course
Allergies:
Coded Allergies:
lactose (GI UPSET 08/07/17)
 
 
Discharge Instructions
 
Medications at Discharge
Discharge Medications:
Continue taking these medications:
Rosuvastatin Calcium (Crestor) 10 MG TABLET
    1 Tablet ORAL DAILY
    Comments:
       COMPARABLE LIPITOR TAKEN DURING STAY
       Last Taken:1/9/18
             Time:0941
 
Glycopyrrolate/Formoterol Fum (Bevespi Aerosphere Inhaler) 9 MCG-4.8 MCG 
HFA.AER.AD
    2 PUFF ORAL TWICE DAILY
    Comments:
       NOT GIVEN IN HOSPITAL
 
Fluticasone Propionate (Flonase Allergy Relief) 50 MCG/ACTUATION SPRAY.SUSP
     DAILY as needed for nasal congestion
    Comments:
       Last Taken:1/8/18
             Time:0927
 
Docusate Sodium (Docusate Sodium) 100 MG CAPSULE
    100 Milligram ORAL TWICE DAILY as needed for CONSTIPATION
    Days = 14
    Instructions:
       STOOL SOFTENER, AVAILABLE OVER THE COUNTER
    Comments:
       NOT TAKEN THIS ADMISSION
 
Mirtazapine (Mirtazapine) 15 MG TABLET
    1 Tablet ORAL Every night
    Qty = 30
    Comments:
       NOT TAKEN THIS ADMISSION
 
Duloxetine HCl (Duloxetine HCl) 60 MG CAPSULE.DR
    1 Capsule ORAL DAILY
    Qty = 90
    Comments:
       Last Taken:1/9/18
             Time:0941
 
Albuterol Sulfate (Proair Hfa) 90 MCG HFA.AER.AD
    2 Puff Inhale through mouth EVERY 4-6 HOURS AS NEEDED as needed for copd
    Comments:
       SYMBICORT TAKEN DURING STAY
       Last Taken:1/9/18
             Time:0941
 
Start taking the following new medications:
Prednisone (Prednisone) 10 MG TABLET
    1 Tablet ORAL DAILY
    Qty = 15
    No Refills
    Instructions:
       Please take 2 tabs  for 5 days 1/9 to 1/13
       then
       Take 1 tab for 5 days 1/14 to 1/18 
    Comments:
       START TODAY UPON DISCHARGE
 
 
 
Attending MD Review Statement
Documenting Attending:
Nancy RODRÍGUEZ,Renetta
Other Findings:
Patient discharged home in stable condition.  Patient to follow-up with his 
pulmonologist Dr. Dozier as an outpatient.

## 2018-01-08 NOTE — ECHOCARDIOGRAM REPORT
MICAH TRISTAN 
 
 Age:    72     :    1945      Gender:     M 
 
 MRN:    [id number] 
 
 Exam Date:     2018  
                13:46 
 
 Exam Location: 1  
 [place] 
 
 Ht (in):     66      Wt (lb):      162     BSA:    1.86 
 
 BP:          142     /     88 
 
 Ordering Physician:        Sendy Cheng MD 
 
 Referring Physician:       Osmin Aguilera MD 
 
 Technologist:              [facility] 
 
 Room Number:               185-02 
 
 Indications:       LIGHTHEADEDNESS 
 
 Rhythm:                 Sinus 
 
 Technical Quality:      Technically difficult study 
 
 FINDINGS 
 
 Left Ventricle 
 Normal size left ventricle. Normal left ventricular wall thickness.  
 Normal left ventricular ejection fraction visually estimated at   > 
 60%. No obvious regional wall motion abnormalities. 
 
 Right Ventricle 
 Normal right ventricular size and function. 
 
 Right Atrium 
 Normal right atrial size. 
 
 Left Atrium 
 Normal left atrial size. 
 
 Mitral Valve 
 Mild mitral annular calcification. Mild mitral regurgitation. 
 
 Aortic Valve 
 Structurally normal aortic valve. No aortic stenosis. Trace aortic  
 regurgitation. 
 
 Tricuspid Valve 
 Tricuspid valve not well visualized, grossly normal. Trace tricuspid  
 regurgitation. Right ventricular systolic pressure estimated to be  
 elevated at  42  mmHg. 
 
 Pulmonic Valve 
 Pulmonic valve not well visualized, grossly normal. Mild pulmonic  
 regurgitation. 
 
 Pericardium 
 No pericardial effusion. 
 
 Great Vessels 
 Normal size aortic root. 
 
 CONCLUSIONS 
 Normal size left ventricle. 
 Normal left ventricular wall thickness. 
 Normal left ventricular ejection fraction visually estimated at   > 
 60%. 
 Mild mitral regurgitation. 
 Trace aortic regurgitation. 
 
 Right ventricular systolic pressure estimated to be elevated at  42   
 mmHg. 
 Mild pulmonic regurgitation. 
 
 Osmin Aguilera M.D. 
 (Electronically Signed) 
 Final Date:      2018  
                  12:40 
 
 MEASUREMENTS  (Male / Female) Normal Values 
 
 2D ECHO 
 LV Diastolic Diameter PLAX        4.1 cm                4.2 - 5.9 / 3.9 - 5.3 
cm 
 LV Systolic Diameter PLAX         1.9 cm                2.1 - 4.0 cm 
 LV Fractional Shortening PLAX     53.7 %                25 - 46  % 
 LV Ejection Fraction 2D Teich     85.0 %                 
 IVS Diastolic Thickness           1.2 cm                 
 LVPW Diastolic Thickness          1.1 cm                 
 LV Relative Wall Thickness        0.6                    
 RV Internal Dim ED PLAX           3.5 cm                1.9 - 3.8 cm 
 LVOT Diameter                     2.0 cm                 
 Aortic Root Diameter              2.9 cm                 
 LA Systolic Diameter LX           4.0 cm                3.0 - 4.0 / 2.7 - 3.8 
cm 
 LA Volume                         30.0 cm              18 - 58 / 22 - 52 cm 
 Ascending Aorta Diameter          2.7 cm                 
 
 DOPPLER 
 AV Peak Velocity                  181.0 cm/s             
 AV Peak Gradient                  13.1 mmHg              
 AV Mean Velocity                  124.0 cm/s             
 AV Mean Gradient                  7.0 mmHg               
 AV Velocity Time Integral         32.5 cm                
 LVOT Peak Velocity                126.0 cm/s             
 LVOT Peak Gradient                6.4 mmHg               
 LVOT Mean Velocity                85.5 cm/s              
 LVOT Mean Gradient                3.0 mmHg               
 LVOT Velocity Time Integral       22.8 cm                
 LVOT Stroke Volume                71.6 cm               
 AV Area Cont Eq vti               2.2 cm                
 AV Area Cont Eq pk                2.2 cm                
 MV Peak Velocity                  99.2 cm/s              
 MV Peak Gradient                  3.9 mmHg               
 MV Mean Velocity                  57.8 cm/s              
 MV Mean Gradient                  2.0 mmHg               
 Mitral E Point Velocity           77.5 cm/s              
 Mitral A Point Velocity           67.6 cm/s              
 Mitral E to A Ratio               1.1                    
 MV PHT Velocity                   104.0 cm/s             
 MV Deceleration Bowie             436.0 cm/s            
 MV Pressure Half Time             71.6 ms                
 MV Area PHT                       3.1 cm                
 MV Deceleration Time              198.0 ms               
 TR Peak Velocity                  306.0 cm/s             
 TR Peak Gradient                  37.5 mmHg              
 Right Atrial Pressure             5.0 mmHg               
 Pulmonary Artery Systolic Pressu  42.5 mmHg              
 Right Ventricular Systolic Press  42.5 mmHg              
 PV Peak Velocity                  160.0 cm/s             
 PV Peak Gradient                  10.2 mmHg              
 PV Mean Velocity                  107.0 cm/s             
 PV Mean Gradient                  5.0 mmHg               
 PV Velocity Time Integral         25.3 cm                
 LV E' Lateral Velocity            8.5 cm/s               
 Mitral E to LV E' Lateral Ratio   9.1                    
 LV E' Septal Velocity             8.7 cm/s               
 Mitral E to LV E' Septal Ratio    8.9

## 2018-01-08 NOTE — PN- HOUSESTAFF
**See Addendum**
Subjective
Follow-up For:
Syncopal attack
Subjective:
Patient was seen and examined this morning, overnight complaints except that he 
wants to be discharged today.  Vital signs are stable, remained normal sinus 
rhythm on telemetry monitor.  No overnight events
 
Review of Systems
Constitutional:
Reports: see HPI. 
 
Objective
Last 24 Hrs of Vital Signs/I&O
 Vital Signs
 
 
Date Time Temp Pulse Resp B/P B/P Pulse O2 O2 Flow FiO2
 
     Mean Ox Delivery Rate 
 
 1453 98.1 90 20 128/72  95 Nasal 2.0L 
 
       Cannula  
 
 1233      95 Nasal 2.0L 
 
       Cannula  
 
 0800       Nasal 2.0L 
 
       Cannula  
 
 0634 98.1 81 20 120/80  93 Nasal 3.0L 
 
       Cannula  
 
 2220 98.1 98 20 150/82  91 Nasal  
 
       Cannula  
 
 2141       Nasal 2.0L 
 
       Cannula  
 
 1954      93 Nasal 2.0L 
 
       Cannula  
 
 
 Intake & Output
 
 
  1600  0800  0000
 
Intake Total 1400 1175 300
 
Output Total   
 
Balance 1400 1175 300
 
    
 
Intake,  800 
 
Intake, Oral 800 375 300
 
Number 1  
 
Bowel   
 
Movements   
 
 
 
 
Physical Exam
General Appearance: Alert, Oriented X3, Cooperative, No Acute Distress
Skin: No Rashes
Skin Temp/Moisture Exam: Warm/Dry
HEENT: Atraumatic, PERRLA, EOMI, Mucous Membr. moist/pink
Neck: Supple
Cardiovascular: Regular Rate, Normal S1, Normal S2, No Murmurs
Lungs: Clear to Auscultation, Normal Air Movement
Abdomen: Normal Bowel Sounds, Soft, No Tenderness
Neurological: Normal Gait, Normal Speech, Strength at 5/5 X4 Ext, Normal Tone, 
Sensation Intact, Cranial Nerves 3-12 NL, Reflexes +3X4 
Extremities: No Clubbing, No Cyanosis, Normal Pulses, BL Trace pedal edema 
 
Assessment/Plan
Assessment:
Mr. Santiago is 72 year old male with chief complain of syncopal episode and 
fall.  Patient has past medical history significant for hyperlipidemia, 
degenerative disc disease with severe central canal stenosis and cord 
compression status post surgical decompression in 2017, osteoarthritis 
of right knee status post knee replacement 2017, prostate cancer status 
post excision , skin cancer. 
 
 
Chest x-ray:
The lungs are hypoexpanded with associated central bronchovascular
prominence. Streaky left basal opacities are noted. Cardiac mediastinal
silhouette appears stable. No pneumothorax. No acute osseous abnormalities.
 
CT head and cervical spine without contrast
IMPRESSION:
1. Left frontal scalp contusion. No acute intracranial pathology.
2. No evidence of acute cervical spine traumatic injury. Multilevel
degenerative change with bilateral C6 laminectomies.
3. Hypoexpanded lungs with central bronchovascular prominence. Streaky left
basal opacity which is nonspecific and may represent subsegmental
atelectasis.
 
Problem list
#Syncopal attack
#Sinus tachycardia resolved
#Abnormal neuro exam with hyperreflexia 4, fasculation and clonus with possible
diagnosis of amyotrophic lateral sclerosis
#Shortness of breath and orthopnea in setting of dysfunctional diaphragm
#Leukocytosis that could be reactive--resolved 
 
Plan
-Neurology consultation was obtained, low suspicious for seizure, thanks 
recommendation.
-Echocardiogram obtained that revealed normal ejection fraction >60% with no 
obvious regional wall motion abnormalities. 
-Continue metoprolol 12.5mg BID, aspirin 81mg daily and a statin per cardiac 
recommendation for possible malignant ventricular arrhythmia given abnormal 
progression of R-wave.
-We'll obtain pulmonary consultation for hypoxia--patient currently on 2 L 
oxygen saturating 95%.
-We will obtain CTA chest and bilateral Doppler per pulmonary recommendation.
-Orthostatic measurement negative.
-Negative prolactin and d-dimer.
-Continue home medication atorvastatin, albuterol, Flonase, Cymbalta.
-Code full
-DVT prophylaxis Lovenox
-Diet regular
 
 
 
 
Problem List:
 1. Syncope
 
Pain Ratin
Pain Location:
N/A
Pain Goal: Pain 4 or less
Pain Plan:
See medication 
Tomorrow's Labs & Rationales:
N/A

## 2018-01-09 VITALS — SYSTOLIC BLOOD PRESSURE: 156 MMHG | DIASTOLIC BLOOD PRESSURE: 90 MMHG

## 2018-01-09 NOTE — PN- HOUSESTAFF
Skylar RODRÍGUEZ,[facility], [place] [address]755:
Subjective
Follow-up For:
Syncopal attack
Subjective:
Patient was seen and examined this morning, overnight complaints.  No overnight 
events, vital signs are stable.  Patient wanted to be discharged today.
 
Review of Systems
Constitutional:
Reports: see HPI. 
 
Objective
Last 24 Hrs of Vital Signs/I&O
 Vital Signs
 
 
Date Time Temp Pulse Resp B/P B/P Pulse O2 O2 Flow FiO2
 
     Mean Ox Delivery Rate 
 
 1131       Nasal 2.0L 
 
       Cannula  
 
 1040      98 Nasal 2.0L 
 
       Cannula  
 
 0710 98.4 93 20 156/90  91 Nasal 2.0L 
 
       Cannula  
 
 0000      94 Nasal 2.0L 
 
       Cannula  
 
 2225  84  128/64     
 
 2157 98.5 91 16 125/70  93   
 
 1940      93 Nasal 2.0L 
 
       Cannula  
 
 
 Intake & Output
 
 
  1600  0800  0000
 
Intake Total  400 702
 
Output Total   
 
Balance  400 702
 
    
 
Intake, Oral  400 702
 
 
 
 
Physical Exam
General Appearance: Alert, Oriented X3, Cooperative, No Acute Distress
Skin: No Rashes
Skin Temp/Moisture Exam: Warm/Dry
HEENT: Atraumatic, PERRLA, EOMI, Mucous Membr. moist/pink
Neck: Supple
Cardiovascular: Regular Rate, Normal S1, Normal S2, No Murmurs
Lungs: Clear to Auscultation, Normal Air Movement
Abdomen: Normal Bowel Sounds, Soft, No Tenderness
Neurological: Normal Gait, Normal Speech, Strength at 5/5 X4 Ext, Normal Tone, 
Sensation Intact, Cranial Nerves 3-12 NL, Reflexes 2+
Extremities: No Clubbing, No Cyanosis, No Edema, Normal Pulses
 
Assessment/Plan
Assessment:
Mr. Santiago is 72 year old male with chief complain of syncopal episode and 
fall.  Patient has past medical history significant for hyperlipidemia, 
degenerative disc disease with severe central canal stenosis and cord 
compression status post surgical decompression in 2017, osteoarthritis 
of right knee status post knee replacement 2017, prostate cancer status 
post excision , skin cancer. 
 
 
Chest x-ray:
The lungs are hypoexpanded with associated central bronchovascular
prominence. Streaky left basal opacities are noted. Cardiac mediastinal
silhouette appears stable. No pneumothorax. No acute osseous abnormalities.
 
CT head and cervical spine without contrast
IMPRESSION:
1. Left frontal scalp contusion. No acute intracranial pathology.
2. No evidence of acute cervical spine traumatic injury. Multilevel
degenerative change with bilateral C6 laminectomies.
3. Hypoexpanded lungs with central bronchovascular prominence. Streaky left
basal opacity which is nonspecific and may represent subsegmental
atelectasis.
 
CTA negative for PE
BL LE doppler venous negative for DVT 
 
Problem list
#Syncopal attack
#Sinus tachycardia resolved
#Abnormal neuro exam with hyperreflexia 4, fasculation and clonus with possible
diagnosis of amyotrophic lateral sclerosis
#Shortness of breath and orthopnea in setting of dysfunctional diaphragm
#Leukocytosis that could be reactive--resolved 
 
Plan
-Neurology consultation was obtained, low suspicious for seizure, thanks 
recommendation.
-Echocardiogram obtained that revealed normal ejection fraction >60% with no 
obvious regional wall motion abnormalities. 
-Continue metoprolol 12.5mg BID, aspirin 81mg daily and a statin per cardiac 
recommendation for possible malignant ventricular arrhythmia given abnormal 
progression of R-wave.
-Pulmonary consultation for hypoxia--patient currently on 2 L oxygen saturating 
95%.
-Will DC on oxygen via NC and predinsone taper 
-Nocturnal sleep study revealed 8 minutes of hypoxia, was done on 2 L oxygen
-Orthostatic measurement negative.
-Negative prolactin and d-dimer.
-Continue home medication atorvastatin, albuterol, Flonase, Cymbalta.
-Code full
-DVT prophylaxis Lovenox
-Diet regular
 
 
 
 
Problem List:
 1. Syncope
 
Pain Ratin
Pain Location:
N/A
Pain Goal: Pain 4 or less
Pain Plan:
See medication 
Tomorrow's Labs & Rationales:
N/A 
 
 
Nancy RODRÍGUEZ,Renetta 18 1310:
Attending MD Review Statement
 
Attending Statement
Attending MD Statement: examined this patient, discuss w/resident/PA/NP, agreed 
w/resident/PA/NP, discussed with family, reviewed EMR data (avail), discussed 
with nursing, discussed with case mgmt, amended to note
Attending Assessment/Plan:
Patient seen and examined.  Resting comfortably admitted any acute distress.  No
issues overnight.  Resting comfortably and maintaining saturation on oxygen 
supplementation.  He offers no complaints today at rest or with ambulation.  He 
remains eager to be discharged home.  On examination he has diminished air entry
in the left lung field.  Otherwise clear to auscultation.  Abdomen is soft and 
nontender.  He has no peripheral edema.  He has no gross focal neurologic 
deficit.
 
Chest CTA done yesterday to further evaluate his hypoxia showed Bibasilar 
enhancing consolidations. The differential diagnosis for this bspecific imaging 
appearance includes pneumonia, post obstructive pneumonitis secondary to central
lung tumor (not visualized on the current study), lymphoma versus metastasis 
from gastrointestinal carcinoma.
 
These findings were discussed with the pulmonologist.  Further discussion 
differentials are quite broad including chronically consolidated long due to 
left-sided diaphragmatic paralysis as well as the differentials listed by the 
radiologist.  Patient will be scheduled to undergo a PET scan as an outpatient.
 
In the meantime he is being discharged home on oxygen supplementation as well as
prednisone for 5 days to help reduce inflammatory changes in the longer.  He 
will also be referred by his pulmonologist to the ENT service as an outpatient. 
His PSA was checked and is < 0.06. 
 
He is medically stable to be discharged home today.

## 2018-01-09 NOTE — PN- PULMONARY
Subjective
HPI/Critical Care Issues:
 
 
Events and data reviewed
 
Patient did have a CAT scan which showed bilateral lower lobe contrast enhancing
consolidation more in the left than the right.  Differential diagnosis is quite 
broad which includes chronically consolidated lung due to left-sided 
diaphragmatic paralysis was his other pathology.  He probably has a significant 
shunt in this area and would need oxygen.
 
Feels okay.
 
He does complain of some loss of appetite but no significant loss of weight.
 
Before coming to the hospital he had felt okay.
 
Objective
Current Medications:
 Current Medications
 
 
  Sig/Ariadna Start time  Last
 
Medication Dose Route Stop Time Status Admin
 
Acetaminophen 650 MG Q6P PRN 01/06 0515 AC 
 
  PO   
 
Albuterol Sulfate 3 ML BID 01/07 1435 AC 01/08
 
  INH   1940
 
Albuterol Sulfate 2 PUF Q4-6 PRN PRN 01/06 0530 AC 
 
  INH   
 
Aspirin 81 MG DAILY 01/08 1000 AC 01/09
 
  PO   0941
 
Atorvastatin Calcium 40 MG 1700 01/06 1700 AC 01/08
 
  PO   1721
 
Budesonide/ 2 PUF BID 01/06 1100 AC 01/09
 
Formoterol Fumarate  INH   0941
 
Duloxetine HCl 60 MG DAILY 01/06 1000 AC 01/09
 
  PO   0941
 
Enoxaparin Sodium 40 MG DAILY 01/06 1000 AC 01/09
 
  SC   0941
 
Fluticasone  2 SPRAY DAILY PRN 01/06 0530 AC 01/08
 
Propionate  CONCEPCION   0927
 
Lorazepam 0.5 MG ONE ONE 01/08 1430 DC 01/08
 
  PO 01/08 1431  1527
 
Metoprolol Tartrate 12.5 MG BID 01/08 1000 AC 01/09
 
  PO   0941
 
Sodium Chloride 1,000 ML Q10H 01/06 1430 DC 01/08
 
  IV   0312
 
Tiotropium [place] 1 PUF DAILY 01/06 1100 AC 01/09
 
  INH   0941
 
 
 
 
Vital Signs & I&O
Last 24 Hrs of Vitals and I&O:
 Vital Signs
 
 
Date Time Temp Pulse Resp B/P B/P Pulse O2 O2 Flow FiO2
 
     Mean Ox Delivery Rate 
 
01/09 0710 98.4 93 20 156/90  91 Nasal 2.0L 
 
       Cannula  
 
01/09 0000      94 Nasal 2.0L 
 
       Cannula  
 
01/08 2225  84  128/64     
 
01/08 2157 98.5 91 16 125/70  93   
 
01/08 1940      93 Nasal 2.0L 
 
       Cannula  
 
01/08 1600       Nasal 2.0L 
 
       Cannula  
 
01/08 1453 98.1 90 20 128/72  95 Nasal 2.0L 
 
       Cannula  
 
01/08 1233      95 Nasal 2.0L 
 
       Cannula  
 
 
 Intake & Output
 
 
 01/09 1600 01/09 0800 01/09 0000
 
Intake Total  400 702
 
Output Total   
 
Balance  400 702
 
    
 
Intake, Oral  400 702
 
 
 
 
Impression/Plan
 
Impression/Plan
Impression/Plan:
ULtrasound of lower ext showed no dvt
 
CTA
CHEST:
 
Lungs: There is mild to moderate centrilobular emphysema within the upper
lobes. There are bibasilar areas of peripheral consolidation, left greater
than right which demonstrate the "CT angiogram sign". The area on the left is
large measuring approximately up to 12 x 5 cm. The airways appear patent to
the subsegmental level. No pleural effusion.
 
Mediastinum: Incidental note made of a bovine arch. Thyroid gland is within
normal limits. Mediastinal lymph nodes measure up to 0.8 cm in short axis.
 
Pleura: There is no pleural effusion or pleural-based mass.
 
Axilla/Chest Wall: The chest wall is normal in appearance. There are no
pathologically enlarged lymph nodes.
 
UPPER ABDOMEN: The imaged portions of the upper abdominal solid viscera are
normal in appearance.
 
OSSEOUS STRUCTURES: Intact and normal in appearance. Minimal diffuse
degenerative changes throughout the visualized thoracic spine.
 
IMPRESSION:
1. No acute pulmonary embolus.
2. Bibasilar enhancing consolidations. The differential diagnosis for this
specific imaging appearance includes pneumonia, post obstructive pneumonitis
secondary to central lung tumor (not visualized on the current study),
lymphoma versus metastasis from gastrointestinal carcinoma.
 
DICTATED BY: Ameena Rasmussen MD 
DATE/TIME DICTATED:01/08/18 / 1622
 
 
 
Physical Exam
General Appearance Alert, Oriented X3, Cooperative, No Acute Distress, 
frustrated
Skin No Rashes
Skin Temp/Moisture Exam: Warm/Dry
HEENT Atraumatic, PERRLA, EOMI, Mucous Membr. moist/pink
Neck Supple
Lymphatic No cervical lymphadenopathy 
Cardiovascular Regular Rate, Normal S1, Normal S2, No Murmurs
Lungs Clear to Auscultation, Decrease air entery of left lung 
Abdomen Normal Bowel Sounds, Soft, No Tenderness
Neurological Normal Speech, Strength at 5/5 X4 Ext, Normal Tone, Sensation 
Intact, Cranial Nerves 3-12 NL, Hyperreflexia x4 clonus fasculation 
Extremities No Clubbing, No Cyanosis, No Edema, Normal Pulses
 
IMPRESSION
 
PT with recent extensive cervical spine surg for severe djd, previous left sided
diapharam paralysis with history of sig smoking, COPD with mixed obstructive 
restrictive lung disease, syncope, with 
 
* Hypoxia due to bilateral lower lobe pulm contrast enhancing infiltrates highly
sugg of resolving chronic atelectasis from his diaphramatic paralysis especially
in the left side. However RACIEL/ lymphoma etc is a possiblitiy but less likely, 
and needs eval as out pt with PET CT etc
 
* Cervical disc disease /  s/p surg as he had quadreperesis before with good 
recovery of function
 
* Atelectasis bibasilar due to cervical dz causeing diapharm dysfunction/needs 
eval
 
* COPD fev1 of 1.49 litres, now no evidence of copde
 
* History of smoking 30 or more pack years quit 2016
 
* Personal history of malignant neoplasm of prostate s/p surg needs follw up
 
* Mixed restrictive and obstructive lung disease 
 
REC
Discussed CT findings with the patient and wife in detail.They will need follow 
up appt as out pt later this week. PET will be arranged soon
Oxygen for dc
Prednisone 20 mg for five days and ten mg for five days for inflammatory lung 
process if any
Cont out pt inhaler rx
Pt advised to see ENT upon dc
Check PSA as he has had prostate cancer before
Discussed with family in detail

## 2018-04-16 ENCOUNTER — HOSPITAL ENCOUNTER (INPATIENT)
Dept: HOSPITAL 68 - ERH | Age: 73
LOS: 4 days | Discharge: HOME HEALTH | DRG: 189 | End: 2018-04-20
Attending: INTERNAL MEDICINE | Admitting: INTERNAL MEDICINE
Payer: COMMERCIAL

## 2018-04-16 VITALS — BODY MASS INDEX: 23.65 KG/M2 | HEIGHT: 66 IN | WEIGHT: 147.19 LBS

## 2018-04-16 VITALS — SYSTOLIC BLOOD PRESSURE: 121 MMHG | DIASTOLIC BLOOD PRESSURE: 67 MMHG

## 2018-04-16 DIAGNOSIS — J98.6: ICD-10-CM

## 2018-04-16 DIAGNOSIS — Z96.651: ICD-10-CM

## 2018-04-16 DIAGNOSIS — J96.21: Primary | ICD-10-CM

## 2018-04-16 DIAGNOSIS — Z87.891: ICD-10-CM

## 2018-04-16 DIAGNOSIS — R41.82: ICD-10-CM

## 2018-04-16 DIAGNOSIS — Z85.828: ICD-10-CM

## 2018-04-16 DIAGNOSIS — L03.116: ICD-10-CM

## 2018-04-16 DIAGNOSIS — E78.5: ICD-10-CM

## 2018-04-16 DIAGNOSIS — T43.215A: ICD-10-CM
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
## 2018-04-16 DIAGNOSIS — Z66: ICD-10-CM

## 2018-04-16 DIAGNOSIS — Z85.46: ICD-10-CM

## 2018-04-16 DIAGNOSIS — J44.1: ICD-10-CM

## 2018-04-16 DIAGNOSIS — E87.3: ICD-10-CM

## 2018-04-16 DIAGNOSIS — E83.39: ICD-10-CM

## 2018-04-16 DIAGNOSIS — E87.0: ICD-10-CM

## 2018-04-16 DIAGNOSIS — J96.22: ICD-10-CM

## 2018-04-16 DIAGNOSIS — Z88.5: ICD-10-CM

## 2018-04-16 DIAGNOSIS — Z99.81: ICD-10-CM

## 2018-04-16 DIAGNOSIS — E86.0: ICD-10-CM

## 2018-04-16 LAB
ABSOLUTE GRANULOCYTE CT: 8 /CUMM (ref 1.4–6.5)
BASOPHILS # BLD: 0 /CUMM (ref 0–0.2)
BASOPHILS NFR BLD: 0.2 % (ref 0–2)
EOSINOPHIL # BLD: 0 /CUMM (ref 0–0.7)
EOSINOPHIL NFR BLD: 0.1 % (ref 0–5)
ERYTHROCYTE [DISTWIDTH] IN BLOOD BY AUTOMATED COUNT: 14 % (ref 11.5–14.5)
GRANULOCYTES NFR BLD: 81.4 % (ref 42.2–75.2)
HCT VFR BLD CALC: 49.9 % (ref 42–52)
LYMPHOCYTES # BLD: 1.1 /CUMM (ref 1.2–3.4)
MCH RBC QN AUTO: 28.4 PG (ref 27–31)
MCHC RBC AUTO-ENTMCNC: 32 G/DL (ref 33–37)
MCV RBC AUTO: 88.6 FL (ref 80–94)
MONOCYTES # BLD: 0.7 /CUMM (ref 0.1–0.6)
PLATELET # BLD: 221 /CUMM (ref 130–400)
PMV BLD AUTO: 8.6 FL (ref 7.4–10.4)
RED BLOOD CELL CT: 5.63 /CUMM (ref 4.7–6.1)
WBC # BLD AUTO: 9.9 /CUMM (ref 4.8–10.8)

## 2018-04-16 PROCEDURE — 84105 ASSAY OF URINE PHOSPHORUS: CPT

## 2018-04-16 NOTE — HISTORY & PHYSICAL
**See Addendum**
Jesus Nicole 04/16/18 0130:
General Information and HPI
MD Statement:
I have seen and personally examined MICAH SANTIAGO JR and documented this H&P.
 
The patient is a 72 year old M who presented with a patient stated chief 
complaint of [].
 
Source of Information: family
Exam Limitations: not alert/orientated
History of Present Illness:
 
Ms Santiago is a a 72 year old man w/a PMHx of COPD (2 L nocturnal prn), past 
smokier 30 pk smoking h/o, chronic respiratory failure, diaphragmatic paralysis 
(left side), Right TKR (2017), hyperlipidemia, degenerative disease with severe 
central stenosis and cord compression cyst status post surgical decompression 
November 2017, was brought to the hospital with a chief concern of altered 
mental status, unresponsiveness the afternoon of admission. The history was 
obtained from the pts wife. 
 
He was known to be in his usual state of health until one week ago, and was 
found to be increasingly somnolent in the last week. He was evaluated three days
ago in the ED for chief concern of increasing somnolence, and altered mentation;
was seen by Dr Dozier and etiology was thought to be due to hypercarbia. He did 
not have any cough, and did not have increasing secretions or inabiilty to clear
secretions. No dyspnea upon ambulation, but reported orthopnea for which he has 
been using extra pillows at night. No fever, no sick contacts. Also reported 
that he has been using increasing oxygen 2L-->3L in the last 24 hrs. He was 
brought to the ED when he was found to be non-arousable. No recent LOC, no CP/
Palpitations/pedal edema/dysurea.  No nausea, diarrhea, melena, bleeding per 
rectum.  He is weak on the left side of the body, which is chronic and the 
family has not reported any acute change in neurological status, and ambulates 
using a cane. 
 
Reported being started on lasix ( dose unknown ) a few days ago for the 
treatment of pedal edema.
 
 
 
Allergies/Medications
Allergies:
Coded Allergies:
morphine (Severe, VOMITING 04/13/18)
lactose (GI UPSET 08/07/17)
 
 
Past History
 
Travel History
Traveled to [place] past 21 day No
 
Medical History
Neurological: NONE
EENT: NONE
Cardiovascular: HYPERCHOLESTERMIA
Respiratory: COPD
Gastrointestinal: NONE
Hepatic: NONE
Renal: NONE
Musculoskeletal: degen joint disease
Psychiatric: NONE
Endocrine: NONE
Blood Disorders: NONE
Cancer(s): prostate cancer, SKIN CA
GYN/Reproductive: NONE
History of MRSA: No
History of VRE: No
History of CDIFF: No
Tetanus Vaccine: 01/06/18
 
Surgical History
Surgical History: TONSILLECTOMY PROSTATECTOMY ULNAR NERVE REPAIR SKIN CA 
EXCISION
 
Review of Systems
 
Review of Systems
Constitutional:
Reports: see HPI. 
EENTM:
Denies: visual changes. 
Cardiovascular:
Reports: orthopena.  Denies: chest pain, palpitations. 
Respiratory:
Denies: short of breath. 
GI:
Denies: diarrhea. 
Genitourinary:
Denies: dysuria. 
Musculoskeletal:
Denies: joint pain. 
Skin:
Denies: change in skin color, change in hair/nails, jaundice. 
Neurological/Psychological:
Reports: confusion. 
Hematologic/Endocrine:
Denies: bruising. 
 
Exam & Diagnostic Data
Last 24 Hrs of Vital Signs/I&O
 Vital Signs
 
 
Date Time Temp Pulse Resp B/P B/P Pulse O2 O2 Flow FiO2
 
     Mean Ox Delivery Rate 
 
04/16 1805 97.1 85 20 102/84  93 BIPAP  
 
04/16 1623 96.2 76 20 124/60  90 BIPAP  
 
04/16 1544  76    93   
 
04/16 1512  110    98   
 
04/16 1444       Nasal 2.0L 
 
       Cannula  
 
04/16 1424 96.5 102 20 121/75  96 Nasal 2.0L 
 
       Cannula  
 
04/16 1142 97.9 91 18 98/63  90 Nasal 2.0L 
 
       Cannula  
 
 
 Intake & Output
 
 
 04/16 1600 04/16 0800 04/16 0000
 
Intake Total 0  
 
Output Total   
 
Balance 0  
 
    
 
Intake, Oral 0  
 
Patient 157 lb  
 
Weight   
 
Weight Reported by Patient  
 
Measurement   
 
Method   
 
 
 
 
Physical Exam
General Appearance No Acute Distress, obtunded not responsive to verbal/painful 
stimuli on BiPAP
Skin No Rashes, No Breakdown
Skin Temp/Moisture Exam: Warm/Dry
Sepsis Skin Exam (color): Normal for Ethnicity
HEENT Atraumatic, PERRLA, mucosal membranes dry
Neck Supple, No JVD
Lymphatic Cervical nl
Cardiovascular Regular Rate, Normal S1, Normal S2, No Murmurs
Lungs Normal Air Movement, ronchi b/l 
Abdomen Normal Bowel Sounds, Soft, No Tenderness
Neurological limited neurological exam reflexes 2+ b/l upper and lower 
extremities except , hyporeflexia on the left upper extremitiy
Extremities No Clubbing, No Cyanosis, No Edema
Vascular Pulses Symmetrical
Sepsis Peripheral Pulse Location: Dorsalis Pedis
Sepsis Peripheral Pulse Exam: Normal
Body Front and Back (Adult)
 
  1) muscular atrophy on the left upper extremity
Last 24 Hrs of Labs/Ned:
 Laboratory Tests
 
04/16/18 1700:
pH 7.46  H, pCO2 41, pO2 70  L, HCO3 28, ABG O2 Sat (Measured) 95.0  L, P-50 (
Temp Corrected) Y, Carboxyhemoglobin 0.9  L, O2 Concentration % 25%, Temperature
96.2  L, Respiration Rate 30, O2 Delivery Method BIPAP, Vent Mode ST, Expiratory
Pressure 6, Inspiratory Pressure 22, Phlebotomy Draw Site RIGHT RADIAL
 
04/16/18 1450:
pH 7.19 *L, pCO2 109 *H, pO2 103  H, HCO3 41  H, ABG O2 Sat (Measured) 96.0, P-
50 (Temp Corrected) Y, Carboxyhemoglobin 1.0  L, O2 Concentration % 2L, 
Temperature 96.5  L, O2 Delivery Method N/C, Phlebotomy Draw Site LEFT RADIAL
 
04/16/18 1412:
Anion Gap 9, Estimated GFR > 60, BUN/Creatinine Ratio 21.7, Glucose 115  H, 
Lactic Acid 0.7, Calcium 10.1, Total Bilirubin 0.6, AST 43, ALT 42, Alkaline 
Phosphatase 64, Troponin I < 0.01, Pro-B-Natriuretic Pept 16.8, Total Protein 
7.0, Albumin 4.0, Globulin 3.0, Albumin/Globulin Ratio 1.3, D-Dimer High 
Sensitivty < 200, CBC w Diff NO MAN DIFF REQ, RBC 5.63, MCV 88.6, MCH 28.4, MCHC
32.0  L, RDW 14.0, MPV 8.6, Gran % 81.4  H, Lymphocytes % 11.5  L, Monocytes % 
6.8, Eosinophils % 0.1, Basophils % 0.2, Absolute Granulocytes 8.0  H, Absolute 
Lymphocytes 1.1  L, Absolute Monocytes 0.7  H, Absolute Eosinophils 0, Absolute 
Basophils 0
 Microbiology
04/16 1814  UPPER RESP: Surveillance Culture - ORD
04/16 1814  GI: Surveillance Culture - ORD
 
 
 
Diagnostic Data
EKG Results
 
Normal sinus rhythm, left axis deviation, left anterior fascicular block, LVH, 
no ST-T wave changes; slightly tall T waves likely secondary to LVH. 
CXR Results
Small left pleural effusion with airspace disease at the left base.
Other Results
CT head pending..
 
Assessment/Plan
Assessment:
 
Mr Santiago is a a 72 year old man w/a PMHx of severe COPD (2 L nocturnal), 
chronic respiratory failure, diaphragmatic paralysis (left side),  Rt TKR s/p 
knee replacement 2017), hyperlipidemia, degenerative disease with severe central
stenosis and cord compression  status post surgical decompression( dx'ed 11/2017
) , was brought to the hospital with a chief concern of AMS a few hours prior to
presentation likey due to diaphragmatic paralysis exacerbated by medication use 
and/or COPD. 
 
At the time of admission: Temperature 97.9, pulse rate 91, estrogen 18, blood 
pressure 98/63 (improved to 124/60), 90% on nasal cannula 2 L.  90% on BiPAP.
 
Pertienent lab findings:
 
Sodium 148, potassium 3.7, chloride 97, bicarbonate 42 (likely from chronic 
hypercarbia, metabolic compensation or diuretic use), BUN 13, creatinine 0.6, 
lactic acid 0.7, troponin I-0.01, d-dimer less than 200.
 
ABG 04/16/2018 1450 p.m. revealed pH 7.19, PCO2 109, PO2 103. Improved after 
being on BiPAP. 
ABG 04/16/2018 5 PM revealed pH 7.46, PCO2 41, PO2 70.
 
 
Last echocardiogram :  08 January 2018  Normal size left ventricle.  Normal left
ventricular wall thickness.  Normal left ventricular ejection fraction visually 
estimated at   >  60%. 
 Mild mitral regurgitation.  Trace aortic regurgitation.  Right ventricular 
systolic pressure estimated to be elevated at  42   mmHg. Mild pulmonic 
regurgitation. 
 
Last PET scan done in 2/18 : 1. Mildly FDG avid bibasal pulmonary opacities are 
present. The pattern and mild diffuse FDG activity is not strongly suspicious 
for a malignant process and is likely inflammatory in etiology. Malignancy would
be a less likely explanation for these findings. Continued monitoring of these 
abnormalities with diagnostic CT imaging is recommended as clinically indicated.
 
Etiology in this case for an acute change in mentation secondary to hypercabia 
is likely multifactorial secondary to left sided diaphramatic paralysis that was
exacerbated by dehydration secondary to drugs, or adverse effect of rameron and 
worsening lung disease ( COPD ). Doest appear to have any AECOPD at this time.  
Other differential diagnosis considered at the time of admission- r/o any 
cerebrovascular accident ( although less likely ), worsening restrictive lung 
disease with decreased DLCO ?; although a remote possibility cervical cord 
compression should be kept in mind.
 
Problem list:
 
#1 Hypercarbic respiratory failure
#2 history of smoking
#3 history of COPD
#4 history of prostate cancer
#5 mixed restrictive/obstructive lung disease
#6 left diaphramatic paralysis 
#7 hypernatremia
 
Plan:
 
-Admit the patient to ICU for closer monitoring given altered mentation. 
-CBC to monitor for infection, start abx, if the pt as fever. 
-Arterial blood gas, if pt is still not responsive. 
-Lower respiratory cultures, if he has any fever. 
-CXR to rule out pneumonia in the am. 
-supplemental oxygen
-Maintain oxygen saturation in between 90 to 92%
-The patient has hypercarbic respiratory failure ventilator support by an NIPPV 
for now. Titrate settings as needed. 
-Bronchodilator therapy with albuterol and ipratropium. TRC. Supplemental O2 
prn. 
-Start iv predniosne 40 Q8, as per the pulmonologist. If the pt doesnt have any 
respiratory symptoms or findings on exam- would dc steroids. No e/o AE COPD. 
-Consider starting antibiotics, if the pt has fever or increased sputum 
production or worsening dyspnea. 
-Start the home med- Bevispi. If it is unavailable in our formulary an 
alternative med to be sought or have the family bring the med given his h/o of 
inability to clear secretions well. 
-Check CT head to r/o any ischemia when the pt is more stable. Start sc heparin 
after confirming that he doesnt have any acute bleed, although less likely. 
-Consider MRI in the am, if the pt has significant neurological deficits on the 
right side. 
-Continue D5 1/2 NS at 75 ml/hr for now. Re-evaluate after checking Na. 
-Check Utox, and consider narcan. 
-Hold mirtazepine for now. Re-assess the need for this medicaition.  
-Would decrease the rate of BiPAP to more physiological rate. 
-Discussed the plan w/ Dr. Alicia, who agreed w/ the above plan. 
 
 
Housekeeping ICU: 
 
#1 Central line - none
#2 Arterial line - none
#3 Barbosa catheter- none
#4 Rectal tube - none
#5 NG tube - none
#6 IV/peripheral line- placed 4/16/18
#7 IV drips- D5 1/2 NS
#8 Vent settings on BiPAP EPAP 6, IPAP 22, RR 28 O@ 25%.
#9 pressors- none.
 
1. DVT PPx- Heparin sc. 
2. NPO for now. 
3. Code DNR/DNI. 
 
 
 
 
 
 
 
 
 
 
As Ranked By This Provider
Problem List:
 1. Hypercapnic respiratory failure
   Qualifiers
 Chronicity: acute Qualified Code: J96.02 - Acute respiratory failure with 
hypercapnia
 
 2. Fatigue
 
 3. Hypercarbia
 
 
Core Measures/Misc (9/17)
 
Acute Coronary Syndrome
ACS Diagnosis: No
 
Congestive Heart Failure
Congestive Heart Failure Diagnosis No
 
Cerebrovascular Accident
CVA/TIA Diagnosis: No
 
VTE (View Protocol)
VTE Risk Factors Acute Medical Illness
No Mechanical VTE Prophylaxis d/t N/A MechProphylax Ordered
No VTE Pharm Prophylaxis d/t NA PharmProphylax ordered
 
Sepsis (View protocol)
Sepsis Present: No
 
Vesta Abarca MD 04/16/18 0565:
General Information and HPI
 
Allergies/Medications
Home Med list
Albuterol Sulfate (Proair Hfa) 90 MCG HFA.AER.AD   2 PUF INH Q4-6 PRN PRN copd  
(Reported)
Cholecalciferol (Vitamin D3) 1,000 UNIT TABLET   1 TAB PO DAILY SUPPLEMENT  (
Reported)
Cyanocobalamin (Vitamin B-12) 1,000 MCG TABLET   1 TAB PO DAILY MENTAL HEATLH  (
Reported)
Docusate Sodium 100 MG CAPSULE   100 MG PO BID PRN CONSTIPATION
     STOOL SOFTENER, AVAILABLE OVER THE COUNTER
Fluticasone Propionate (Flonase Allergy Relief) 50 MCG/ACTUATION SPRAY.SUSP 
nasal congestion  (Reported)
Glycopyrrolate/Formoterol Fum (Bevespi Aerosphere Inhaler) 9 MCG-4.8 MCG 
HFA.AER.AD   2 PUFF PO BID COPD  (Reported)
Mirtazapine 15 MG TABLET   1 TAB PO QPM SLEEP HELP  (Reported)
Rosuvastatin Calcium (Crestor) 10 MG TABLET   1 TAB PO DAILY CHOLESTEROL  (
Reported)
Ubidecarenone (Coenzyme Q-10) 200 MG CAPSULE   1 TAB PO DAILY HEART HEALTH  (
Reported)
 
 
Past Family/Social History
 
Psychosocial History
Other Social History:
Family history non contributory to present illness
 
 
Attending MD Review Statement
 
Attending Statement
Attending MD Statement: examined this patient, discuss w/resident/PA/NP, agreed 
w/resident/PA/NP, discussed with family, reviewed EMR data (avail), reviewed 
images
Attending Assessment/Plan:
72-year-old male fairly extensive past medical history including cervical disc 
disease, chronic diaphragmatic paralysis with atelectatic basal consolidations ,
COPD, chronic respiratory failure with ex-tobacco history and hyperlipidemia. He
made an ER visit on April 13 for hypoxemia. He was seen by Dr. Dozier in the 
emergency room on 4/13 and the thought was that this was likely medication 
related acute on chronic hypoxic and hypercapnic respiratory failure. The new 
medicine started was Cymbalta. At that time patient with advised to come in but 
he wanted to go home and given there was no acute pneumonia and no acute 
precipitating factor and his respiratory status improved considerably, he was 
discharged home.
He is brought in today after having this episode of lethargy and 
unresponsiveness at home and a sat that was recorded at 69%. Initially when he 
came into the emergency room he was lethargic, hypotensive and his initial blood
gas showed a pH of 7.19 with a PCO2 of 109 and a PO2 of 103 reflective of acute 
on chronic hypercapnic respiratory failure with acute hypercapnic respiratory 
acidosis. He also has this markedly elevated bicarbonate reflecting a 
compensated metabolic alkalosis. Again there appears to be no localizing 
infection or COPD exacerbation contributing to this acute deterioration. 
Although he used to be on fairly hefty dose narcotics in the past, as per his 
wife of late he has been on nothing and his U tox done on April 13 was 
completely negative. At this point I'm unclear as to the etiology of the acute 
deterioration of his respiratory status. We'll bring him into the ICU, continue 
his BiPAP while watching his respiratory status and repeat his blood gas to make
sure that his hypercapnia and his pH have improved. I don't think there is any 
indication for antibiotics or steroids at this point but will call a formal 
pulmonary consult to help clarify that issue.  The resident and I spoke to the 
patient's wife at length.  She is explicitly clear that given patient's known 
wishes he is DNR/DNI.  She understands the severity of the respiratory illness. 
For now will do BiPAP and follow closely.  If his mentation does not improve 
will have a low threshold to get a CT head.  Put him on DVT prophylaxis and 
follow closely.

## 2018-04-16 NOTE — CT SCAN REPORT
EXAMINATION:
CT HEAD WITHOUT CONTRAST
 
CLINICAL INFORMATION:
Altered mental status. Concern for mass.
 
COMPARISON:
01/06/2018.
 
TECHNIQUE:
Contiguous axial images of the brain were obtained without IV contrast.
 
DLP: 645 mGy-cm.
 
FINDINGS:
There are no pathologic extra-axial fluid collections. The lateral, third,
fourth ventricles are mildly prominent, though stable, age-appropriate and
concordant with the appearance of the sulci. There is no evidence for acute
intraparenchymal hemorrhage or infarct. There is neither mass nor mass
effect. There is no shift of midline structures. The paranasal sinuses and
mastoid air cells are clear. There are no osseous lesions.
 
IMPRESSION:
No evidence for acute intracranial injury.

## 2018-04-16 NOTE — CONS- PULMONARY
General Information and HPI
 
Consulting Request
Date of Consult: 04/16/18
Requested By:
House staff
Reason for Consult:
Acute on chronic hypercapnic was straight failure
History of Present Illness:
Patient is 72-year-old with history of COPD documented paralysis chronic 
hypercapnic respiratory failure recently seen in the emergency room and refused 
admission.  At that time his PCO2 is 66.  Bicarbonate had increased suggesting 
chronic hypercarbia with renal compensation.  Today he was unable to be aroused 
and presented with acute on chronic hypercapnic respiratory failure.  Chest x-
ray does not show evidence of pneumonia his white count is normal.  With BiPAP 
his hypercapnia has improved and he has a post-hypercapnic metabolic alkalosis 
associated with mild hypernatremia.  Patient remains obtunded
 
Allergies/Medications
Allergies:
Coded Allergies:
morphine (Severe, VOMITING 04/13/18)
lactose (GI UPSET 08/07/17)
 
Home Med List:
Albuterol Sulfate (Proair Hfa) 90 MCG HFA.AER.AD   2 PUF INH Q4-6 PRN PRN copd  
(Reported)
Cholecalciferol (Vitamin D3) 1,000 UNIT TABLET   1 TAB PO DAILY SUPPLEMENT  (
Reported)
Cyanocobalamin (Vitamin B-12) 1,000 MCG TABLET   1 TAB PO DAILY MENTAL HEATLH  (
Reported)
Docusate Sodium 100 MG CAPSULE   100 MG PO BID PRN CONSTIPATION
     STOOL SOFTENER, AVAILABLE OVER THE COUNTER
Fluticasone Propionate (Flonase Allergy Relief) 50 MCG/ACTUATION SPRAY.SUSP 
nasal congestion  (Reported)
Glycopyrrolate/Formoterol Fum (Bevespi Aerosphere Inhaler) 9 MCG-4.8 MCG 
HFA.AER.AD   2 PUFF PO BID COPD  (Reported)
Mirtazapine 15 MG TABLET   1 TAB PO QPM SLEEP HELP  (Reported)
Rosuvastatin Calcium (Crestor) 10 MG TABLET   1 TAB PO DAILY CHOLESTEROL  (
Reported)
Ubidecarenone (Coenzyme Q-10) 200 MG CAPSULE   1 TAB PO DAILY HEART HEALTH  (
Reported)
 
 
Past History
 
Travel History
Traveled to [place] past 21 day No
 
Medical History
Neurological: NONE
EENT: NONE
Cardiovascular: HYPERCHOLESTERMIA
Respiratory: COPD
Gastrointestinal: NONE
Hepatic: NONE
Renal: NONE
Musculoskeletal: degen joint disease
Psychiatric: NONE
Endocrine: NONE
Blood Disorders: NONE
Cancer(s): prostate cancer, SKIN CA
GYN/Reproductive: NONE
 
Surgical History
Surgical History: TONSILLECTOMY PROSTATECTOMY ULNAR NERVE REPAIR SKIN CA 
EXCISION
 
Exam & Diagnostic Data
Last 24 Hrs of Vital Signs/I&O
 Vital Signs
 
 
Date Time Temp Pulse Resp B/P B/P Pulse O2 O2 Flow FiO2
 
     Mean Ox Delivery Rate 
 
04/16 1805 97.1 85 20 102/84  93 BIPAP  
 
04/16 1623 96.2 76 20 124/60  90 BIPAP  
 
04/16 1544  76    93   
 
04/16 1512  110    98   
 
04/16 1444       Nasal 2.0L 
 
       Cannula  
 
04/16 1424 96.5 102 20 121/75  96 Nasal 2.0L 
 
       Cannula  
 
04/16 1142 97.9 91 18 98/63  90 Nasal 2.0L 
 
       Cannula  
 
 
 Intake & Output
 
 
 04/16 1600 04/16 0800 04/16 0000
 
Intake Total 0  
 
Output Total   
 
Balance 0  
 
    
 
Intake, Oral 0  
 
Patient 157 lb  
 
Weight   
 
Weight Reported by Patient  
 
Measurement   
 
Method   
 
 
Oxygen saturation FiO2 25% 94% HEENT exam shows 2 mm equal pupils exam of the 
chest shows diminished breath sounds are no wheezes cardiac exam shows a regular
S1 and S2 without murmurs abdomen is soft nontender there is no significant 
edema.  6 x-ray shows chronic left basilar changes due to paralyzed 
hemidiaphragm
Last 48 Hrs of Labs/Ned:
 Laboratory Tests
 
04/16/18 1700:
pH 7.46  H, pCO2 41, pO2 70  L, HCO3 28, ABG O2 Sat (Measured) 95.0  L, P-50 (
Temp Corrected) Y, Carboxyhemoglobin 0.9  L, O2 Concentration % 25%, Temperature
96.2  L, Respiration Rate 30, O2 Delivery Method BIPAP, Vent Mode ST, Expiratory
Pressure 6, Inspiratory Pressure 22, Phlebotomy Draw Site RIGHT RADIAL
 
04/16/18 1450:
pH 7.19 *L, pCO2 109 *H, pO2 103  H, HCO3 41  H, ABG O2 Sat (Measured) 96.0, P-
50 (Temp Corrected) Y, Carboxyhemoglobin 1.0  L, O2 Concentration % 2L, 
Temperature 96.5  L, O2 Delivery Method N/C, Phlebotomy Draw Site LEFT RADIAL
 
04/16/18 1412:
Anion Gap 9, Estimated GFR > 60, BUN/Creatinine Ratio 21.7, Glucose 115  H, 
Lactic Acid 0.7, Calcium 10.1, Total Bilirubin 0.6, AST 43, ALT 42, Alkaline 
Phosphatase 64, Troponin I < 0.01, Pro-B-Natriuretic Pept 16.8, Total Protein 
7.0, Albumin 4.0, Globulin 3.0, Albumin/Globulin Ratio 1.3, D-Dimer High 
Sensitivty < 200, CBC w Diff NO MAN DIFF REQ, RBC 5.63, MCV 88.6, MCH 28.4, MCHC
32.0  L, RDW 14.0, MPV 8.6, Gran % 81.4  H, Lymphocytes % 11.5  L, Monocytes % 
6.8, Eosinophils % 0.1, Basophils % 0.2, Absolute Granulocytes 8.0  H, Absolute 
Lymphocytes 1.1  L, Absolute Monocytes 0.7  H, Absolute Eosinophils 0, Absolute 
Basophils 0
 
 
Assessment/Plan
Impression/Plan:
72-year-old gentleman deemed to be DNR/DNI with end-stage lung disease on 
chronic home O2 for chronic hypoxic respiratory failure admitted with acute on 
chronic hypercapnic history failure improved on BiPAP.  His failure to mentally 
clear may be related to residual cerebral hypercapnia however other diagnoses 
should be entertained and CT scan is pending.  Family maintains there is no 
availability of opiates.  Patient appears to be mildly dehydrated with 
hypercapnia
Recommendations:
Continue BiPAP at current settings.  Gentle intravenous hydration with hypotonic
fluid.  Noncontrast CT scan of the head is pending.  At this point this does not
appear to be need for antibiotics for pneumonia.  Family has verified DNR/DNI 
status.  Aspiration precautions.  DVT prophylaxis.
 
 
Consult Acknowledgment
- Thank you for your consult request.

## 2018-04-16 NOTE — RADIOLOGY REPORT
EXAMINATION:
CHEST 1 VIEW
 
CLINICAL INFORMATION:
Hypoxia. Lethargy.
 
COMPARISON:
04/13/2018.
 
TECHNIQUE:
An AP view of the chest is provided.
 
FINDINGS:
The cardiac silhouette is stable. There is a small left pleural effusion with
associated airspace disease, similar to prior exam. The osseous structures
are stable.
 
IMPRESSION:
Small left pleural effusion with airspace disease at the left base.

## 2018-04-16 NOTE — ED GENERAL ADULT
History of Present Illness
 
General
Chief Complaint: General Adult
Stated Complaint: PER WIFE "I COULDNT WAKE HIM UP"
Source: family, old records
Exam Limitations: clinical condition
 
Vital Signs & Intake/Output
Vital Signs & Intake/Output
 Vital Signs
 
 
Date Time Temp Pulse Resp B/P B/P Pulse O2 O2 Flow FiO2
 
     Mean Ox Delivery Rate 
 
2024 98.8 113 26 121/67  94 BIPAP 25% 
 
 1919  92    94   
 
 1904  91 20 105/68  93 BIPAP  
 
 1805 97.1 85 20 102/84  93 BIPAP  
 
 1623 96.2 76 20 124/60  90 BIPAP  
 
 1544  76    93   
 
 1512  110    98   
 
 1444       Nasal 2.0L 
 
       Cannula  
 
 1424 96.5 102 20 121/75  96 Nasal 2.0L 
 
       Cannula  
 
 1142 97.9 91 18 98/63  90 Nasal 2.0L 
 
       Cannula  
 
 
 
Allergies
Coded Allergies:
morphine (Severe, VOMITING 18)
lactose (GI UPSET 17)
 
Triage Note:
PT TO ER WITH FAMILY C/C, "UNRESPONSIVE EPISODE X
HOURS WITH OXYGEN SATURATION OF 69%". PT AOX3 AT
PRESENT, DENIES C/P OR SOB. USES 2 L NC AT
BASELINE FOR HX OF "FAILED DIAPHRAGM". O2 SAT IN
TRIAGE 88-94%, BP 98/63.
Triage Nurses Notes Reviewed? yes
Onset: Abrupt
Duration: day(s): (3), changing over time, continues in ED, getting worse
Timing: recent history
Injury Environment: home
Severity: moderate, severe
No Modifying Factors: none
HPI:
72-year-old male past medical history of COPD and diaphragm injury on 2 L nasal 
cannula at nighttime presents for evaluation of altered mental status.  Patient 
was seen here 3 days ago was found to be hypercarbic over that time he was able 
to ambulate and was alert and oriented 3.  He was discharged home with 
instructions to follow-up with pulmonology.  Patient's wife reports that over 
the past several days he has become more lethargic and is now unresponsive.  She
states that she had been increasing his oxygen because she noted that he was 
satting in the 60s and 70s at home.  Patient currently is only responsive to 
painful stimuli.  He is not able to contribute to history.
(Jesus SANTOS,Piotr)
Reconcile Medications
Albuterol Sulfate (Proair Hfa) 90 MCG HFA.AER.AD   2 PUF INH Q4-6 PRN PRN copd  
(Reported)
Cholecalciferol (Vitamin D3) 1,000 UNIT TABLET   1 TAB PO DAILY SUPPLEMENT  (
Reported)
Cyanocobalamin (Vitamin B-12) 1,000 MCG TABLET   1 TAB PO DAILY MENTAL HEATLH  (
Reported)
Docusate Sodium 100 MG CAPSULE   100 MG PO BID PRN CONSTIPATION
     STOOL SOFTENER, AVAILABLE OVER THE COUNTER
Fluticasone Propionate (Flonase Allergy Relief) 50 MCG/ACTUATION SPRAY.SUSP 
nasal congestion  (Reported)
Glycopyrrolate/Formoterol Fum (Bevespi Aerosphere Inhaler) 9 MCG-4.8 MCG 
HFA.AER.AD   2 PUFF PO BID COPD  (Reported)
Mirtazapine 15 MG TABLET   1 TAB PO QPM SLEEP HELP  (Reported)
Rosuvastatin Calcium (Crestor) 10 MG TABLET   1 TAB PO DAILY CHOLESTEROL  (
Reported)
Ubidecarenone (Coenzyme Q-10) 200 MG CAPSULE   1 TAB PO DAILY HEART HEALTH  (
Reported)
 
(Kyle Thurston DO)
 
Past History
 
Travel History
Traveled to [place] past 21 day No
 
Medical History
Any Pertinent Medical History? see below for history
Neurological: NONE
EENT: NONE
Cardiovascular: HYPERCHOLESTERMIA
Respiratory: COPD
Gastrointestinal: NONE
Hepatic: NONE
Renal: NONE
Musculoskeletal: degen joint disease
Psychiatric: NONE
Endocrine: NONE
Blood Disorders: NONE
Cancer(s): prostate cancer, SKIN CA
GYN/Reproductive: NONE
History of MRSA: No
History of VRE: No
History of CDIFF: No
Tetanus Vaccine: 18
 
Surgical History
Surgical History: TONSILLECTOMY PROSTATECTOMY ULNAR NERVE REPAIR SKIN CA 
EXCISION
 
Psychosocial History
Who do you live with Spouse
What is your primary language English
Tobacco Use: Quit >30 days ago
 
Family History
Hx Contributory? No
(Piotr Crowder)
 
Review of Systems
 
Review of Systems
Constitutional:
Reports: no symptoms. 
EENTM:
Reports: no symptoms. 
Respiratory:
Reports: see HPI. 
Cardiovascular:
Reports: no symptoms. 
GI:
Reports: no symptoms. 
Genitourinary:
Reports: no symptoms. 
Musculoskeletal:
Reports: no symptoms. 
Skin:
Reports: no symptoms. 
Neurological/Psychological:
Reports: see HPI. 
Hematologic/Endocrine:
Reports: no symptoms. 
Immunologic/Allergic:
Reports: no symptoms. 
All Other Systems: Reviewed and Negative
(Piotr Crowder)
 
Physical Exam
 
Physical Exam
General Appearance: well developed/nourished, no apparent distress, lethargic, 
thin, RESPONSIVE TO PAINFUL STIMULI ONLY
Head: atraumatic, normal appearance
Eyes:
Bilateral: normal appearance, PERRL, EOMI. 
Ears, Nose, Throat: normal pharynx, normal ENT inspection, hearing grossly 
normal
Neck: normal inspection, supple, full range of motion
Respiratory: normal breath sounds, chest non-tender, no respiratory distress, 
lungs clear
Cardiovascular: regular rate/rhythm, normal peripheral pulses
Peripheral Pulses:
2+ radial (R), 2+ radial (L)
Gastrointestinal: soft, non-tender
Back: normal inspection, normal range of motion, no vertebral tenderness
Extremities: normal inspection, normal range of motion, no edema
Neurologic/Psych: PATIENT IS ALERT AND ORIENTED TO PAINFUL STIMULI ONLY ON 
INITIAL EVALUATION
Skin: intact, normal color, warm/dry
Lymphatic: no anterior cervical amee
 
Core Measures
ACS in differential dx? No
CVA/TIA Diagnosis: No
Sepsis Present: No
Sepsis Focused Exam Completed? No
(Jesus SANTOS,Piotr)
 
Progress
Differential Diagnoses
I considered the following diagnoses in my evaluation of the patient: [
Hypercarbia, PE, CHF, COPD, pneumonia, aortic dissection, sepsis]
 
Plan of Care:
 Orders
 
 
Procedure Date/time Status
 
Nothing by Mouth  D Active
 
CBC WITHOUT DIFFERENTIAL  0600 Active
 
ICU LAB BUNDLE  0500 Active
 
TROPONIN LEVEL  0100 Active
 
EKG  0100 Active
 
Wound Care/Dressing  194 Complete
 
Weight 1940 Active
 
VTE Mechanical Prophylaxis 1940 Active
 
Vital Signs 1940 Active
 
Turn and Reposition 1940 Active
 
Drains/Tubes 1940 Complete
 
Teach/Educate  194 Active
 
Skin Integrity Protocol  194 Active
 
Skin/Pressure Ulcer Assess (Sk  194 Active
 
Precautions  194 Active
 
Pain Treatment and Response 1940 Active
 
Nutritional Intake, Monitor 1940 Active
 
Isolation  194 Active
 
CIWA  194 Complete
 
Patient Care Conference  1940 Active
 
Activity/Ambulation  194 Active
 
VRE ACTIVE SURVIELLANCE  1814 Active
 
ACTIVE SURVEILLANCE NARES  1814 Active
 
Pathway - chart  1802 Active
 
Code Status  1802 Active
 
Patient Data  1716 Active
 
BIPAP  1715 Complete
 
ED Holding Orders  1653 Active
 
Admit to inpatient  1653 Active
 
Vital Signs  1653 Active
 
Code Status  1653 Complete
 
URINE DRUG SCREEN FOR ER ONLY  1621 Active
 
BIPAP  1600 Complete
 
BIPAP  1530 Complete
 
ARTERIAL BLOOD GAS (GEN)  1530 Complete
 
Add-on Test (ER Only)  1503 Active
 
ARTERIAL BLOOD GAS (GEN)  1432 Complete
 
Intake & Output  1426 Active
 
Add-on Test (ER Only)  1415 Active
 
Add-on Test (ER Only)  1414 Active
 
B-TYPE NATRIURETIC PEP (BNP)  1412 Complete
 
URINALYSIS  1236 Active
 
TROPONIN LEVEL  1236 Complete
 
LACTIC ACID  1236 Complete
 
D-DIMER  1236 Complete
 
COMPREHENSIVE METABOLIC PANEL  1236 Complete
 
CBC WITHOUT DIFFERENTIAL  1236 Complete
 
EKG  1143 Active
 
TRC EVALUATION (GEN)   UNK Active
 
PEAK FLOW MEASUREMENT (GEN)   UNK Active
 
BIPAP   UNK Complete
 
House Staff   UNK Active
 
VTE Mechanical Prophylaxis   UNK Active
 
Vital Signs   UNK Complete
 
Intake & Output   UNK Complete
 
Activity/Ambulation   UNK Active
 
 
 Current Medications
 
 
  Sig/Ariadna Start time  Last
 
Medication Dose  Stop Time Status Admin
 
Heparin Sodium  5,000 UNIT Q8  2200 AC 
 
(Porcine)     
 
Albuterol Sulfate 2 PUF Q4-6 PRN PRN  1830 CAN 
 
(Ventolin)     
 
Albuterol Sulfate 3 ML Q4H PRN  1830 AC 
 
(Proventil)     
 
Dextrose/Sodium  1,000 ML Q13H  1830 AC 
 
Chloride    0729  1854
 
(D5W-1/2 Normal      
 
Saline 1000ML)     
 
Methylprednisolone 40 MG Q8  1830 AC 
 
(Solumedrol)     1854
 
Non-Formulary  0 SEE ADMIN CRITERIA  1830 UNVr 
 
Medication     
 
(NON FORMULARY)     
 
 
 Laboratory Tests
 
 
 
18 1700:
pH 7.46  H, pCO2 41, pO2 70  L, HCO3 28, ABG O2 Sat (Measured) 95.0  L, P-50 (
Temp Corrected) Y, Carboxyhemoglobin 0.9  L, O2 Concentration % 25%, Temperature
96.2  L, Respiration Rate 30, O2 Delivery Method BIPAP, Vent Mode ST, Expiratory
Pressure 6, Inspiratory Pressure 22, Phlebotomy Draw Site RIGHT RADIAL
 
18 1450:
pH 7.19 *L, pCO2 109 *H, pO2 103  H, HCO3 41  H, ABG O2 Sat (Measured) 96.0, P-
50 (Temp Corrected) Y, Carboxyhemoglobin 1.0  L, O2 Concentration % 2L, 
Temperature 96.5  L, O2 Delivery Method N/C, Phlebotomy Draw Site LEFT RADIAL
 
18 1412:
Anion Gap 9, Estimated GFR > 60, BUN/Creatinine Ratio 21.7, Glucose 115  H, 
Lactic Acid 0.7, Calcium 10.1, Total Bilirubin 0.6, AST 43, ALT 42, Alkaline 
Phosphatase 64, Troponin I < 0.01, Pro-B-Natriuretic Pept 16.8, Total Protein 
7.0, Albumin 4.0, Globulin 3.0, Albumin/Globulin Ratio 1.3, D-Dimer High 
Sensitivty < 200, CBC w Diff NO MAN DIFF REQ, RBC 5.63, MCV 88.6, MCH 28.4, MCHC
32.0  L, RDW 14.0, MPV 8.6, Gran % 81.4  H, Lymphocytes % 11.5  L, Monocytes % 
6.8, Eosinophils % 0.1, Basophils % 0.2, Absolute Granulocytes 8.0  H, Absolute 
Lymphocytes 1.1  L, Absolute Monocytes 0.7  H, Absolute Eosinophils 0, Absolute 
Basophils 0
 Microbiology
1814  UPPER RESP: Surveillance Culture - COLB
1814  GI: Surveillance Culture - COLB
 
 
 
Seen and evaluated.  Initial evaluation he is lethargic responsive only to 
painful stimuli.  No signs of hypoxia.  Wife reports that she has been 
increasing his oxygen due to hypoxia at home.  Basic blood work ABG obtained.  
ABG shows pH of 7.19 and a CO2 level of 109.  BiPAP was ordered.  Remaining 
blood work is within normal limits EKG does not show any acute changes.  Chest x
-ray ordered.
 
Patient is starting to wake up after being on BiPAP for a little over an hour.  
His repeat blood gas shows pH 7.41 and CO2 in the 40s.  Patient will be admitted
to the ICU.  Case discussed with Dr. Thurston he agrees. DR THURSTON ADMITTED THE PT 
Diagnostic Imaging:
Viewed by Me: Radiology Read.  Discussed w/RAD: Radiology Read. 
CXR Impression: PATIENT: MICAH TRISTAN JR  MEDICAL RECORD NO: [id number] PRESENT 
AGE: 72  PATIENT ACCOUNT NO: [id number] : 46  LOCATION: [facility] ORDERING 
PHYSICIAN: Piotr SANTOS     SERVICE DATE:  EXAM TYPE: RAD - XRY-
PORTABLE CHEST XRAY EXAMINATION: CHEST 1 VIEW CLINICAL INFORMATION: Hypoxia. 
Lethargy. COMPARISON: 2018. TECHNIQUE: An AP view of the chest is 
provided. FINDINGS: The cardiac silhouette is stable. There is a small left 
pleural effusion with associated airspace disease, similar to prior exam. The 
osseous structures are stable. IMPRESSION: Small left pleural effusion with 
airspace disease at the left base. DICTATED BY: Michoacano Milian MD  DATE/TIME 
DICTATED:18 :RAD.JOHNSON  DATE/TIME TRANSCRIBED:1540 CONFIDENTIAL, DO NOT COPY WITHOUT APPROPRIATE AUTHORIZATION.  <
Electronically signed in Other Vendor System>                                   
                                                    SIGNED BY: Michoacano Milian MD 
18 [id number]
Initial ED EKG: normal sinus rhythm, lafb, st elevation not changed from 
previous
(Piotr Crowder)
 
Departure
 
Departure
Disposition: STILL A PATIENT
Condition: Stable
Clinical Impression
Primary Impression: Hypercapnic respiratory failure
Qualifiers:  Chronicity: acute Qualified Code: J96.02 - Acute respiratory 
failure with hypercapnia
Referrals:
Seble Hull MD (PCP/Family)
 
Departure Forms:
Customer Survey
General Discharge Information
 
Admission Note
Spoke With:
Vesta Abarca MD
Documentation of Exam:
Documentation of any treatments & extenuating circumstances including Concerns 
Regarding Discharge (functional status, medication knowledge or non-compliance, 
living conditions, etc.) that warrant an admission rather than observation: [
Pulmonology consult, BiPAP, serial labs, serial EKGs, serial blood gas, 
monitoring of vital signs, telemetry, critical care, cardiology consult, 
medication adjustment]
 
(Piotr Crowder)
 
Admission Note
Documentation of Exam:
Documentation of any treatments & extenuating circumstances including Concerns 
Regarding Discharge (functional status, medication knowledge or non-compliance, 
living conditions, etc.) that warrant an admission rather than observation: 
 
 
PA/NP Co-Sign Statement
Statement:
ED Attending supervision documentation-
 
[X] I saw and evaluated the patient. I have also reviewed all the pertinent lab 
results and diagnostic results. I agree with the findings and the plan of care 
as documented in the PA's/NP's documentation. 
 
[] I have reviewed the ED Record and agree with the PA's/NP's documentation.
 
[] Additions or exceptions (if any) to the PAs/NP's note and plan are 
summarized below:
[]
 
 
72-year-old male presented to the emergency Department hypercarbic respiratory 
failure.  His CO2 on his blood gas was 100.  The patient was placed on BiPAP.  
His mental status improved over time.  On reevaluation his CO2 had come down on 
the repeat blood gas.  He is currently awake and alert.
(Mehran HALL,Kyle SOFIA)
 
Critical Care Note
 
Critical Care Note
Critical Care Time:  min
(Black PA,Piotr)

## 2018-04-16 NOTE — ADMISSION CERTIFICATION
Admission Certification
 
Certification Statement
- As attending physician, I certify that at the time of
- admission, based on clinical presentation, severity of
- symptoms, need for further diagnostic testing and
- therapeutic interventions, and risk of adverse outcomes
- without in-hospital treatment, in my clinical assessment,
- this patient requires an acute hospital stay for a minimum
- of two nights or longer. I have also considered psychsocial
- factors such as support system, advanced age, financial
- issues, cognitive issues, and failed out-patient treatments,
- past re-admission history, safety of patient, and lack of
- compliance as applicable.
Specific rationale supporting this admission is:
Acute on chronic hypercapnic respiratory failure needs BiPAP and ICU monitoring.

## 2018-04-17 VITALS — DIASTOLIC BLOOD PRESSURE: 88 MMHG | SYSTOLIC BLOOD PRESSURE: 152 MMHG

## 2018-04-17 VITALS — DIASTOLIC BLOOD PRESSURE: 50 MMHG | SYSTOLIC BLOOD PRESSURE: 92 MMHG

## 2018-04-17 VITALS — SYSTOLIC BLOOD PRESSURE: 148 MMHG | DIASTOLIC BLOOD PRESSURE: 68 MMHG

## 2018-04-17 LAB
ABSOLUTE GRANULOCYTE CT: 9.7 /CUMM (ref 1.4–6.5)
BASOPHILS # BLD: 0 /CUMM (ref 0–0.2)
BASOPHILS NFR BLD: 0 % (ref 0–2)
EOSINOPHIL # BLD: 0 /CUMM (ref 0–0.7)
EOSINOPHIL NFR BLD: 0 % (ref 0–5)
ERYTHROCYTE [DISTWIDTH] IN BLOOD BY AUTOMATED COUNT: 14.1 % (ref 11.5–14.5)
GRANULOCYTES NFR BLD: 86.2 % (ref 42.2–75.2)
HCT VFR BLD CALC: 45.2 % (ref 42–52)
LYMPHOCYTES # BLD: 0.9 /CUMM (ref 1.2–3.4)
MCH RBC QN AUTO: 28.3 PG (ref 27–31)
MCHC RBC AUTO-ENTMCNC: 32.6 G/DL (ref 33–37)
MCV RBC AUTO: 86.9 FL (ref 80–94)
MONOCYTES # BLD: 0.7 /CUMM (ref 0.1–0.6)
PLATELET # BLD: 223 /CUMM (ref 130–400)
PMV BLD AUTO: 9.7 FL (ref 7.4–10.4)
RED BLOOD CELL CT: 5.2 /CUMM (ref 4.7–6.1)
WBC # BLD AUTO: 11.3 /CUMM (ref 4.8–10.8)

## 2018-04-17 NOTE — PN- RESIDENT CRCU
Abdoul RODRÍGUEZ,[facility] 18 1214:
Subjective
HPI/CRCU Issues:
#1 Hypercarbic respiratory failure 
#2 History of COPD 
#3 mixed restrictive/obstructive lung disease
#4 Left diaphramatic paralysis 
#5 History of prostate cancer
#6 Hypernatremia - resolved
24 Hour Events:
Patient states that his symptoms has been going on for more than a week now, he 
was somnolent/lethargic but had no cough, sputum production or fever/chills.
At home he is on 2 L of oxygen at night only for his COPD.  Does report 
increased his oxygen for the past 24 hours. 
Thinks he is back to his baseline with regards to his respiratory and mental 
status.
 
 
Objective
 
Vital Signs & I&O
Last 8 Hrs of Vitals and I&O:
 Intake & Output
 
 
  1600
 
Intake Total 1005
 
Output Total 225
 
Balance 780
 
  
 
Intake, 
 
Intake, Oral 480
 
Number 1
 
Bowel 
 
Movements 
 
Output, Urine 225
 
 
 
 
Exam
General Appearance: no apparent distress, alert, awake, comfortable
Head: atraumatic, normal appearance
Neck: normal inspection, supple, full range of motion
Respiratory: chest non-tender, rhonchi
Cardiovascular: regular rate/rhythm
Gastrointestinal: normal bowel sounds, soft, non-tender
Extremities: normal inspection, no edema, Left foot swollen and rythematous, no 
pain/ tenderness on palpation
Cranial Nerves: normal hearing, normal speech, PERRL, strength 5/5 in RU and RLE
, +4/5 in ALISON and LLE. 
Current Medications:
 Current Medications
 
 
  Sig/Ariadna Start time  Last
 
Medication Dose Route Stop Time Status Admin
 
Albuterol Sulfate 3 ML Q4P PRN  2145 AC 
 
  INH   
 
Albuterol Sulfate 2 PUF Q4-6 PRN PRN  1830 CAN 
 
  INH   
 
Albuterol Sulfate 3 ML Q4H PRN  1830 AC 
 
  INH   
 
Dextrose/Sodium  1,000 ML Q13H  1830 DC 
 
Chloride  IV  0729  1854
 
Heparin Sodium  5,000 UNIT Q8  2200 AC 
 
(Porcine)  SC   0724
 
Magnesium Sulfate 1 GM Q2H  0715 DC 
 
Dextrose/Water 100 ML IV  1114  0918
 
Methylprednisolone 40 MG Q12  2100 AC 
 
  IV   
 
Methylprednisolone 0 .STK-MED ONE  1840 DC 
 
  .ROUTE   
 
Methylprednisolone 40 MG Q8  1830 DC 
 
  IV   0724
 
Naloxone HCl 0 .STK-MED ONE  1840 DC 
 
  .ROUTE   
 
Naloxone HCl 0.4 MG ONCE ONE  1815 DC 
 
  IV  181  1854
 
Non-Formulary  0 SEE ADMIN CRITERIA  1830 CAN 
 
Medication  ANY   
 
Phosphate 500 MG PC AND AT BEDTIME  1309 AC 
 
  PO   
 
Phosphate 250 MG PC AND AT BEDTIME  1300 CAN 
 
  PO   
 
Potassium Chloride 40 MEQ ONCE ONE  0715 DC 
 
  PO  0716  0752
 
Potassium Phosphate 15 mMol ONE ONE  0930 DC 
 
Dextrose/Water 250 ML IV  1334  1026
 
Sodium Chloride 1,000 ML BOLUS ONE  1500 DC 
 
  IV  1559  1512
 
 
 
 
Impression/Plan
 
Impression/Problem List
Impression:
Mr Santiago is a a 72 year old man w/a PMHx of severe COPD (2 L nocturnal), 
chronic respiratory failure, diaphragmatic paralysis (left side),  Rt TKR s/p 
knee replacement ), hyperlipidemia, degenerative disease with severe central
stenosis and cord compression  status post surgical decompression( dx'ed 2017
) , was brought to the hospital with a chief concern of AMS a few hours prior to
presentation likey due to Hypercarbic respiratory failure from diaphragmatic 
paralysis exacerbated by medication use and/or COPD. 
 
Patient is admitted to the ICU for management of the following issues;
 
Respiratory
1.  Hypercarbic respiratory failure; ABGs at the time of admission showed a pH 
of 7.19 and with PCO2 of 109 and bicarbonate 41.  She was started on BiPAP with 
55% oxygen, repeat ABGs couple of hour liters showed improvement with a pH of 
7.46 PCO2 41 and bicarbonate 28.  Patient has has a history of COPD, even though
does mention requiring increased oxygen for the past 24 hours but does not 
report any increased cough, sputum production or fever/chills.  Patient also has
chronic respiratory failure from left-sided diaphragmatic paralysis.  Phosphorus
level was 1.1 this morning, which could affect the diaphragmatic functioning and
responsible for patient's current symptoms.
 
- Patient currently on 3 L of oxygen via nasal cannula, saturating above 90%.
- BiPAP at night
- Continue Solu-Medrol 40 mg every 12.
- Monitor off antibiotics
- Patient started on Phosphorus 500 mg PC and at bedtime.
- Will repeat levels @ 6 pm today and replete accordingly. 
- Monitor Vitals and WBC Curve
 
Infectious;
Patient's left foot is swollen and erythematous, per the wife swelling has 
always been there but erythema is new.  Denies any trauma.  Patient remains 
afebrile, WBC count increased slightly from 9.9 to 11.3(could be from steroid 
use).
- Left lower extremity Doppler to rule out DVT
- We will do a chest x-ray to rule out any soft tissue infection or fractures,
- Monitor off antibiotics for now.
 
Cardiology;
None
 
Heme;
None
 
Metabolic;
1. Hyperlipidemia;
- Continue Rosuvastatin.
 
2. Hypophosphatemia;
- Continue Nutra-phos 500mg QID.
- Repeat labs later in the evening today and replete accordingly. 
 
3. Hypomagnesemia;
- Continue to monitor and replete accordingly. 
 
Alimentary;
1. Hyperbilirubinemia; patient's total bilirubin increased from 0.6 to 10.4 this
morning.  Denies any abdominal pain, nausea, vomiting or fever/chills.
- We'll obtain right upper quadrant ultrasound to rule out any pathology.
 
Nephro;
None
 
Neuro;
1.  Somnolence/Altered mental status; likely secondary to hypercarbia.  Resolved
 
DVT Prophylaxis; ALPS, S/C Lovenox
Patient is DNR/DNI
Heart Healthy Diet
Problem List:
 1. Hypercapnic respiratory failure
 
Pain Ratin
Pain Location:
NA
Pain Goal: Remain pain free
Pain Plan:
NA
Tomorrow's Labs & Rationales:
CBC
ICU Bundle
 
Plan
DVT/Prophylaxis: mechanical, pharmacological
 
 
Maral RODRÍGUEZ,Jose 18 1448:
Attending MD Review Statement
 
Attending Sign Off
Attending Cosign Statement:
I have: examined this patient, reviewed [facility] EMR data, personally reviewd 
images, discussd w/resident/PA/NP, discussed mgmt plan w/evelina, discussed mgmt 
plan w/CM, discussed mgmt plan w/pt, agreed w/resident/PA/NP, amended to note. 
Other Findings:
Impression
72 year old man
 
* acute on chronic (improved) hypercarbic respiratory failure - can be likely 
secondary to exacerbation of COPD
 
Plan
-off bipap now, can use prn and nocturnal for tonight
-reduce solumedrol to 40mg iv q12h
-trc/nebs
-replete electrolytes
 
DVT prophylaxis at all times
 
TTS 35 min
 
DG to gen med

## 2018-04-17 NOTE — RADIOLOGY REPORT
EXAMINATION:
XR FOOT, LEFT
 
CLINICAL INFORMATION:
Erythema. Swelling.
 
COMPARISON:
None
 
TECHNIQUE:
AP, lateral, and oblique views of the left foot.
 
FINDINGS:
There is no focal bone lesion. No periosteal reaction. No radiographic
evidence for osteomyelitis. There is no air in the soft tissue and no
radiopaque foreign body.
 
There is joint narrowing DIP joint of the toes with marginal bone spurs.
There is a prominent spur of the calcaneus at the plantar surface of the
bone.
 
IMPRESSION:
 
1. No radiographic evidence for osteomyelitis.
2. Degenerative joint disease of the IP joints of the toes.
3. Plantar calcaneal spur.

## 2018-04-17 NOTE — ULTRASOUND REPORT
EXAMINATION:
US TRIPLEX LOWER EXTREMITY, LEFT
 
CLINICAL INFORMATION:
Left lower extremity/foot swollen and erythematous.
 
COMPARISON:
01/08/2018
 
TECHNIQUE:
Color-flow triplex imaging with spectral analysis and compression Doppler
were performed on the lower extremity.
 
FINDINGS:
Respiratory variation, normal compression and augmented flow are noted
throughout the lower extremity. The visualized common femoral vein,
superficial femoral vein, profunda femoral vein, popliteal vein and midcalf
peroneal and posterior tibial venous segments show no evidence of deep venous
thrombosis.
 
There is no Baker's cyst.
 
IMPRESSION:
Normal triplex scan without evidence of deep venous thrombosis involving the
lower extremity.

## 2018-04-17 NOTE — ULTRASOUND REPORT
EXAMINATION:
US ABDOMEN LIMITED
 
CLINICAL INFORMATION:
Elevated total bilirubin level. Rule out liver/gallbladder pathology.
 
COMPARISON:
PET/CT scan dated 02/13/2018.
 
TECHNIQUE:
Real-time imaging of the right upper quadrant abdominal viscera.
 
FINDINGS:
 
PANCREAS: The pancreas is obscured by overlying bowel gas.
 
LIVER: Normal. The liver demonstrates normal size, contour and echogenicity.
No focal lesion or intrahepatic biliary duct dilatation.
 
GALLBLADDER: There is a 0.5 x 0.4 x 0.4 cm echogenic nonshadowing focus seen
adherent to the nondependent gallbladder wall, most consistent with a small
polyp. The gallbladder is physiologically distended without evidence of
stones, sludge, wall thickening or pericholecystic fluid.
 
COMMON BILE DUCT: Normal in caliber measuring 0.2 cm in diameter.
 
RIGHT KIDNEY: Normal. No hydronephrosis. No renal calculi or focal
parenchymal lesions. The kidney measures 11.7 cm in maximum dimension.
 
FREE FLUID: None.
 
IMPRESSION:
 
1. Small gallbladder wall polyp is seen. The gallbladder is otherwise
unremarkable. No evidence of acute cholecystitis or biliary obstruction.
2. Pancreas obscured by overlying bowel gas.
3. Liver unremarkable.

## 2018-04-18 VITALS — DIASTOLIC BLOOD PRESSURE: 78 MMHG | SYSTOLIC BLOOD PRESSURE: 126 MMHG

## 2018-04-18 VITALS — SYSTOLIC BLOOD PRESSURE: 118 MMHG | DIASTOLIC BLOOD PRESSURE: 60 MMHG

## 2018-04-18 VITALS — DIASTOLIC BLOOD PRESSURE: 82 MMHG | SYSTOLIC BLOOD PRESSURE: 114 MMHG

## 2018-04-18 LAB
ABSOLUTE GRANULOCYTE CT: 7.7 /CUMM (ref 1.4–6.5)
BASOPHILS # BLD: 0 /CUMM (ref 0–0.2)
BASOPHILS NFR BLD: 0.1 % (ref 0–2)
EOSINOPHIL # BLD: 0 /CUMM (ref 0–0.7)
EOSINOPHIL NFR BLD: 0.2 % (ref 0–5)
ERYTHROCYTE [DISTWIDTH] IN BLOOD BY AUTOMATED COUNT: 14.6 % (ref 11.5–14.5)
GRANULOCYTES NFR BLD: 81.7 % (ref 42.2–75.2)
HCT VFR BLD CALC: 41.7 % (ref 42–52)
LYMPHOCYTES # BLD: 1 /CUMM (ref 1.2–3.4)
MCH RBC QN AUTO: 28.5 PG (ref 27–31)
MCHC RBC AUTO-ENTMCNC: 33.1 G/DL (ref 33–37)
MCV RBC AUTO: 86.3 FL (ref 80–94)
MONOCYTES # BLD: 0.7 /CUMM (ref 0.1–0.6)
PLATELET # BLD: 213 /CUMM (ref 130–400)
PMV BLD AUTO: 9.7 FL (ref 7.4–10.4)
RED BLOOD CELL CT: 4.82 /CUMM (ref 4.7–6.1)
WBC # BLD AUTO: 9.4 /CUMM (ref 4.8–10.8)

## 2018-04-18 NOTE — PN- PULMONARY
Subjective
HPI/Critical Care Issues:
Patient is awake alert ambulating in the room
 
Objective
Current Medications:
 Current Medications
 
 
  Sig/Ariadna Start time  Last
 
Medication Dose Route Stop Time Status Admin
 
Albuterol Sulfate 3 ML Q4P PRN 04/16 2145 AC 
 
  INH   
 
Albuterol Sulfate 3 ML Q4H PRN 04/16 1830 AC 
 
  INH   
 
Heparin Sodium  5,000 UNIT Q8 04/16 2200 AC 04/18
 
(Porcine)  SC   0656
 
Magnesium Sulfate 1 GM Q2H 04/18 0045 DC 04/18
 
Dextrose/Water 100 ML IV 04/18 0444  0300
 
Magnesium Sulfate 1 GM Q2H 04/17 0715 DC 04/17
 
Dextrose/Water 100 ML IV 04/17 1114  0918
 
Methylprednisolone 40 MG Q12 04/17 2100 AC 04/18
 
  IV   0832
 
Methylprednisolone 40 MG Q8 04/16 1830 DC 04/17
 
  IV   0724
 
Phosphate 250 MG PC AND AT BEDTIME 04/18 0900 AC 04/18
 
  PO   0832
 
Phosphate 500 MG PC AND AT BEDTIME 04/17 1309 DC 04/17
 
  PO   2108
 
Phosphate 250 MG PC AND AT BEDTIME 04/17 1300 CAN 
 
  PO   
 
Potassium Phosphate 15 mMol ONE ONE 04/17 0930 DC 04/17
 
Dextrose/Water 250 ML IV 04/17 1334  1026
 
 
 
 
Vital Signs & I&O
Last 24 Hrs of Vitals and I&O:
 Vital Signs
 
 
Date Time Temp Pulse Resp B/P B/P Pulse O2 O2 Flow FiO2
 
     Mean Ox Delivery Rate 
 
04/18 0012  89    95   
 
04/18 0000      94 BIPAP 25% 
 
04/18 0000 97.6 88 20 126/78  94 BIPAP 25% 
 
04/17 2220  101    98   
 
04/17 2033      95 Nasal 2.0L 
 
       Cannula  
 
04/17 1600      93 Nasal 2.0L 
 
       Cannula  
 
04/17 1600 98.7 103 22 148/68  93 Nasal 2.0L 
 
       Cannula  
 
04/17 0856      91 Nasal 3.0L 
 
       Cannula  
 
04/17 0855      90   
 
 
 Intake & Output
 
 
 04/18 1600 04/18 0800 04/18 0000
 
Intake Total  370 720
 
Output Total  300 225
 
Balance  70 495
 
    
 
Intake, IV  250 
 
Intake, Oral  120 720
 
Number  0 0
 
Bowel   
 
Movements   
 
Output, Urine  300 225
 
 
Saturation 2 L baseline 95% exam of his chest shows diminished breath sounds 
there are no wheezes or crackles cardiac exam shows a regular S1 and S2 without 
murmurs
 
Impression/Plan
 
Impression/Plan
Impression/Plan:
82-year-old with end-stage lung disease improved acute on chronic hypercapnic 
respiratory failure.  Back to baseline with chronic hypoxic respiratory failure.
Recommendations:
DC IV Solu-Medrol begin by mouth prednisone.  Patient should be referred to the 
COPD wellness clinic after discharge.  Physical therapy evaluation.

## 2018-04-18 NOTE — DISCHARGE SUMMARY
Visit Information
 
Visit Dates
Admission Date:
04/16/18
 
Discharge Date:
04/19/18
 
 
Hospital Course
 
Course
Attending Physician:
Jose Alicia MD
 
Primary Care Physician:
Kurtis RODRÍGUEZ,Simlaia
 
Hospital Course:
72 year old man w/a PMHx of severe COPD (2 L nocturnal), chronic respiratory 
failure, diaphragmatic paralysis (left side),  Rt TKR s/p knee replacement 2017)
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 
compression  status post surgical decompression( dx'ed 11/2017) , was brought to
the hospital with a chief concern of AMS a few hours prior to presentation likey
due to Hypercarbic respiratory failure from diaphragmatic paralysis exacerbated 
by medication use and/or COPD. 
 
Patient was initially admitted to ICU, later tranferred to [facility] for 
management of the following issues.
# Hypercarbic respiratory failure 
# History of COPD 
# Mixed restrictive/obstructive lung disease
# Left diaphramatic paralysis 
# History of prostate cancer
# Hypernatremia
# Hypophosphatemia
# Left Foot Cellulitis
 
Altered mental status liklely from Hypercarbic respiratory failure
Patient was brought to the ED when he was found to be non-arousable and has been
somnolent for the 1 week. Upon admission the patient mentioned requiring 
increased oxygen demand but did not report any increased cough, sputum 
production or fever/chills. ABGs at the time of admission showed a pH of 7.19 
and with PCO2 of 109 and bicarbonate 41. Head CT was negative for any acute 
causes. He was started on BiPAP with 55% oxygen and repeat ABGs couple of hour 
later showed improvement with a pH of 7.46 PCO2 41 and bicarbonate 28. A 
possible cause of his chronic respiratory failure may be due to left-sided 
diaphragmatic paralysis. Phosphorus level was found to be 1.1 which could affect
the diaphragmatic functioning and responsible for patient's current symptoms/
respiratory failure. Phosphorus levels was repleted as needed. BiPAP was 
continued at night and he was maintained on oxygen via nasal cannula during the 
daytime. He was started on IV Solu-Medrol which was later transitioned to po 
prednisone. He was monitored off antibiotics. He was afebrile during his 
hospital with a borderline leukocytosis most likely secondary to steroids An 
overnight pulse oximetry test was done he did not meet the criteria for home 
CPAP.  He was discharged with his home oxygen regimen, prednisone taper, and 
Symbicort and Spiriva. He was advised to follow up with pulmnology who will also
schedule a sleep study.
 
Left foot cellulitis
Patient's left foot was swollen and erythematous. Per the wife: the swelling has
always been there but erythema was new.  Denied any trauma.  Patient remained 
afebrile, WBC count increased slightly from 9.9 to 11.3(could be from steroid 
use). Left lower extremity Doppler was negative for any DVT. X-ray ruled out any
osteomyelitis, soft tissue infection or fractures. Patient was started on Keflex
and discharged on keflex to complete 1 week total course of antibiotics.
 
Hypomagnesemia
Magnesium level was monitored and repleted accordingly. 
 
Hypokalemia
Patient was sent home on K-Rosanna 20mEq daily. 
 
Hyperbilirubinemia
Patient's total bilirubin increased from 0.6 to 1.4. The patient denied any 
abdominal pain, nausea, vomiting or fever/chills. Right upper quadrant 
ultrasound was obtained which was negative for any acute pathology.
 
Hyperlipidemia
Rosuvastatin was continued.
Allergies:
Coded Allergies:
morphine (Severe, VOMITING 04/13/18)
lactose (GI UPSET 08/07/17)
 
Significant Procedures:
XRY-PORTABLE CHEST XRAY
FINDINGS:
The cardiac silhouette is stable. There is a small left pleural effusion with
associated airspace disease, similar to prior exam. The osseous structures
are stable.
 
IMPRESSION:
Small left pleural effusion with airspace disease at the left base.
 
CT HEAD WO IV CONTRAST
FINDINGS:
There are no pathologic extra-axial fluid collections. The lateral, third,
fourth ventricles are mildly prominent, though stable, age-appropriate and
concordant with the appearance of the sulci. There is no evidence for acute
intraparenchymal hemorrhage or infarct. There is neither mass nor mass
effect. There is no shift of midline structures. The paranasal sinuses and
mastoid air cells are clear. There are no osseous lesions.
 
IMPRESSION:
No evidence for acute intracranial injury.
 
US-LIMITED ABDOMEN
IMPRESSION:
 
1. Small gallbladder wall polyp is seen. The gallbladder is otherwise
unremarkable. No evidence of acute cholecystitis or biliary obstruction.
2. Pancreas obscured by overlying bowel gas.
3. Liver unremarkable.
 
XRY-FOOT COMPLETE, LEFT
FINDINGS:
There is no focal bone lesion. No periosteal reaction. No radiographic
evidence for osteomyelitis. There is no air in the soft tissue and no
radiopaque foreign body.
 
There is joint narrowing DIP joint of the toes with marginal bone spurs.
There is a prominent spur of the calcaneus at the plantar surface of the
bone.
 
IMPRESSION:
 
1. No radiographic evidence for osteomyelitis.
2. Degenerative joint disease of the IP joints of the toes.
3. Plantar calcaneal spur.
 
US-UNILATERAL VENOUS DOPPLER
FINDINGS:
Respiratory variation, normal compression and augmented flow are noted
throughout the lower extremity. The visualized common femoral vein,
superficial femoral vein, profunda femoral vein, popliteal vein and midcalf
peroneal and posterior tibial venous segments show no evidence of deep venous
thrombosis.
 
There is no Baker's cyst.
 
IMPRESSION:
Normal triplex scan without evidence of deep venous thrombosis involving the
lower extremity.
 
Disposition Summary
 
Disposition
Principal Diagnosis:
Acute hypoxic respiratory failure
Additional Diagnosis:
Hypophosphatemia
Left foot cellulitis
Discharge Disposition: home health services
 
Discharge Instructions
 
General Discharge Information
Code Status: Do Not Resucitate/Intubat
Patient's Diet:
Regular
Patient's Activity:
As tolerated
Follow-Up Instructions/Appts:
Please follow-up with your PCP within a week after discharge.
 
Please follow-up with your pulmonologist within a week after discharge.
 
Please take all your medications as directed.
 
Medications at Discharge
Discharge Medications:
Stop taking the following medications:
Glycopyrrolate/Formoterol Fum (Bevespi Aerosphere Inhaler) 9 MCG-4.8 MCG 
HFA.AER.AD ORAL TWICE DAILY 
 
Continue taking these medications:
Rosuvastatin Calcium (Crestor) 10 MG TABLET
    1 Tablet ORAL DAILY
    Comments:
       NOT GIVEN WHILE IN HOSPITAL
 
Fluticasone Propionate (Flonase Allergy Relief) 50 MCG/ACTUATION SPRAY.SUSP
     DAILY as needed for nasal congestion
    Comments:
       NOT GIVEN WHILE IN HOSPITAL
 
Docusate Sodium (Docusate Sodium) 100 MG CAPSULE
    100 Milligram ORAL TWICE DAILY as needed for CONSTIPATION
    Days = 14
    Instructions:
       STOOL SOFTENER, AVAILABLE OVER THE COUNTER
    Comments:
       NOT GIVEN WHILE IN HOSPITAL
 
Mirtazapine (Mirtazapine) 15 MG TABLET
    1 Tablet ORAL Every night
    Qty = 30
    Comments:
       NOT GIVEN WHILE IN HOSPITAL
 
Albuterol Sulfate (Proair Hfa) 90 MCG HFA.AER.AD
    2 Puff Inhale through mouth EVERY 4-6 HOURS AS NEEDED as needed for copd
    Comments:
       NOT GIVEN WHILE IN HOSPITAL
 
Cyanocobalamin (Vitamin B-12) 1,000 MCG TABLET
    1 Tablet ORAL DAILY
    Qty = 30
    Comments:
       NOT GIVEN WHILE IN HOSPITAL
 
Cholecalciferol (Vitamin D3) 1,000 UNIT TABLET
    1 Tablet ORAL DAILY
    Comments:
       NOT GIVEN WHILE IN HOSPITAL
 
Ubidecarenone (Coenzyme Q-10) 200 MG CAPSULE
    1 Tablet ORAL DAILY
    Comments:
       NOT GIVEN WHILE IN HOSPITAL
 
Start taking the following new medications:
Tiotropium Bromide (Spiriva) 18 MCG CAP.W.DEV
    1 Puff Inhale through mouth DAILY
    Qty = 1
    Refills = 1
    Comments:
       Last Taken:4/20/18
             Time: 10:30 AM
 
Budesonide/Formoterol Fumarate (Symbicort 160-4.5 Mcg Inhaler) 160 MCG-4.5 MCG/
ACTUATION HFA.AER.AD
    2 Puff Inhale through mouth TWICE DAILY
    Qty = 1
    Refills = 1
    Comments:
       Last Taken:4/20/18
             Time: 10:30 AM
 
Prednisone (Prednisone) 10 MG TABLET
    1 Tablet ORAL DAILY
    Qty = 12
    No Refills
    Instructions:
       DATE        TAB
       4/21        3
       4/22-24     2
       4/25-27     1
    Comments:
       Last Taken:4/20/18
             Time: 10:30 AM
 
Potassium Chloride (Klor-Con) 20 MEQ PACKET
    1 Packet ORAL DAILY
    Qty = 30
    No Refills
 
Cephalexin (Keflex) 500 MG CAPSULE
    1 Capsule ORAL EVERY SIX HOURS
    Qty = 21
    No Refills
    Comments:
       Last Taken:4/20/18
             Time: 12:00 PM
 
 
Copies To:
Jaycee Craft DO, MD,Alex WONG
 
             Time: 12:00 PM
 
 
Copies To:
Jaycee Craft DO, MD,Alex WONG

## 2018-04-18 NOTE — PN- HOUSESTAFF
Abdoul RODRÍGUEZ,[facility] 18 0849:
Subjective
Follow-up For:
# Hypercarbic respiratory failure 
# History of COPD 
# mixed restrictive/obstructive lung disease
# Left diaphramatic paralysis 
# History of prostate cancer
# Hypernatremia - resolved
# Hypophosphatemia - resolved
# Left Foot Cellulitis
Complaints: no complaints
Subjective:
Patient reports improvement in his breathing, denies any cough, sputum 
production or fever/chills.  No overnight events.
 
Review of Systems
Constitutional:
Reports: no symptoms. 
EENTM:
Reports: no symptoms. 
Cardiovascular:
Reports: no symptoms. 
Respiratory:
Reports: no symptoms. 
Gastrointestinal:
Reports: no symptoms. 
Genitourinary:
Reports: no symptoms. 
Musculoskeletal:
Reports: no symptoms. 
Skin:
Reports: no symptoms. 
Neurological/Psychological:
Reports: no symptoms. 
Hematologic/Endocrine:
Reports: no symptoms. 
Immunologic/Allergic:
Reports: no symptoms. 
 
Objective
Last 24 Hrs of Vital Signs/I&O
 Vital Signs
 
 
Date Time Temp Pulse Resp B/P B/P Pulse O2 O2 Flow FiO2
 
     Mean Ox Delivery Rate 
 
 1242 99.0 98 20 118/60  95 Nasal 2.0L 
 
       Cannula  
 
 1005      94 Nasal 2.0L 
 
       Cannula  
 
 0800      95 Nasal 2.0L 
 
       Cannula  
 
 0800 98.5 94 20 118/60  95 Nasal 2.0L 
 
       Cannula  
 
 0012  89    95   
 
 0000      94 BIPAP 25% 
 
 0000 97.6 88 20 126/78  94 BIPAP 25% 
 
 2220  101    98   
 
 2033      95 Nasal 2.0L 
 
       Cannula  
 
 1600      93 Nasal 2.0L 
 
       Cannula  
 
 1600 98.7 103 22 148/68  93 Nasal 2.0L 
 
       Cannula  
 
 
 Intake & Output
 
 
  1600  0800  0000
 
Intake Total 720 370 720
 
Output Total  300 225
 
Balance 720 70 495
 
    
 
Intake, IV 0 250 
 
Intake, Oral 720 120 720
 
Number 1 0 0
 
Bowel   
 
Movements   
 
Output, Urine  300 225
 
 
 
 
Physical Exam
General Appearance: Alert, Oriented X3, Cooperative
Skin: No Rashes, No Breakdown
Cardiovascular: Regular Rate, Normal S1, Normal S2
Lungs: Decreased breath sounds
Abdomen: Normal Bowel Sounds, Soft, No Tenderness
Extremities: No Clubbing, No Cyanosis, Erythema and swelling of left foot
Current Medications:
 Current Medications
 
 
  Sig/Ariadna Start time  Last
 
Medication Dose Route Stop Time Status Admin
 
Albuterol Sulfate 3 ML Q4P PRN  2145 AC 
 
  INH   
 
Albuterol Sulfate 3 ML Q4H PRN  1830 AC 
 
  INH   
 
Cephalexin 500 MG Q6  1800 UNVr 
 
  PO   
 
Heparin Sodium  5,000 UNIT Q8  2200 AC 
 
(Porcine)  SC   1312
 
Magnesium Sulfate 1 GM Q2H  0045 DC 
 
Dextrose/Water 100 ML IV  0444  0300
 
Methylprednisolone 40 MG Q12  2100 AC 
 
  IV   0832
 
Phosphate 250 MG PC AND AT BEDTIME  0900 AC 
 
  PO   1312
 
Phosphate 500 MG PC AND AT BEDTIME  1309 DC 
 
  PO   2108
 
Potassium Phosphate 15 mMol ONE ONE  0930 DC 
 
Dextrose/Water 250 ML IV  1334  1026
 
 
 
 
Last 24 Hrs of Lab/Ned Results
Last 24 Hrs of Labs/Mics:
 Laboratory Tests
 
18 0527:
Anion Gap 11, Estimated GFR > 60, Glucose 107  H, Calcium 9.1, Phosphorus 3.3, 
Magnesium 1.9, Total Bilirubin 0.9, AST 61  H, ALT 49, Albumin 3.2  L, CBC w 
Diff NO MAN DIFF REQ, RBC 4.82, MCV 86.3, MCH 28.5, MCHC 33.1, RDW 14.6  H, MPV 
9.7, Gran % 81.7  H, Lymphocytes % 10.9  L, Monocytes % 7.1, Eosinophils % 0.2, 
Basophils % 0.1, Absolute Granulocytes 7.7  H, Absolute Lymphocytes 1.0  L, 
Absolute Monocytes 0.7  H, Absolute Eosinophils 0, Absolute Basophils 0
 
18 2315:
Anion Gap 10, Estimated GFR > 60, Glucose 113  H, Calcium 9.3, Phosphorus 4.4, 
Magnesium 1.5  L, Total Bilirubin 0.7, AST 73  H, ALT 49, Albumin 3.4  L
 
 
Assessment/Plan
Assessment:
Mr Santiago is a a 72 year old man w/a PMHx of severe COPD (2 L nocturnal), 
chronic respiratory failure, diaphragmatic paralysis (left side),  Rt TKR s/p 
knee replacement ), hyperlipidemia, degenerative disease with severe central
stenosis and cord compression  status post surgical decompression( dx'ed 2017
) , was brought to the hospital with a chief concern of AMS a few hours prior to
presentation likey due to Hypercarbic respiratory failure from diaphragmatic 
paralysis exacerbated by medication use and/or COPD. 
 
Patient is admitted to the ICU for management of the following issues;
 
1.  Hypercarbic respiratory failure; ABGs at the time of admission showed a pH 
of 7.19 and with PCO2 of 109 and bicarbonate 41.  She was started on BiPAP with 
55% oxygen, repeat ABGs couple of hour liters showed improvement with a pH of 
7.46 PCO2 41 and bicarbonate 28.  Patient has has a history of COPD, even though
does mention requiring increased oxygen for the past 24 hours but does not 
report any increased cough, sputum production or fever/chills.  Patient also has
chronic respiratory failure from left-sided diaphragmatic paralysis.  Phosphorus
level was 1.1 this morning, which could affect the diaphragmatic functioning and
be responsible for patient's current respiratory failure.
 
- Patient currently on 3 L of oxygen via nasal cannula, saturating above 90%. 
Resting O2 sats dropped down to 80s on room air, will continue supplemenatl O2 
for now. 
- BiPAP at night
- Discontinue IV Solu-Medrol and start the patient on Prednisone 40 mg daily.
- Monitor off antibiotics
- Phosphorus level 3.3 this am, will decrease Neutra-Phos from 500 mg to 250 mg 
PC and at bedtime.
- Monitor Vitals and WBC Curve
 
Left foot cellulitis;
Patient's left foot is swollen and erythematous, per the wife swelling has 
always been there but erythema is new.  Denies any trauma.  Patient remains 
afebrile, WBC count increased slightly from 9.9 to 11.3(could be from steroid 
use).
- Left foot remains erythematous and swollen, will start the patient on PO 
Keflex 500 mg every 6 for presumed cellulitis.
- Remains afebrile with no white blood cell count.
- Left lower extremity Doppler negative for DVT
- Foot x-ray negative for any soft tissue infection or fractures.
 
 
Hyperlipidemia;
- Continue Rosuvastatin.
 
Hypomagnesemia;
- Continue to monitor and replete accordingly. 
 
Hyperbilirubinemia; patient's total bilirubin increased from 0.6 to 10.4 this 
morning.  Denies any abdominal pain, nausea, vomiting or fever/chills.
- RUQ ultrasound negative for any acute pathology.
 
DVT Prophylaxis; ALPS, S/C Lovenox
Patient is DNR/DNI
Heart Healthy Diet
Problem List:
 1. Hypercapnic respiratory failure
 
 2. Cellulitis
 
Pain Ratin
Pain Location:
None
Pain Goal: Remain pain free
Pain Plan:
NA
Tomorrow's Labs & Rationales:
CBC
Icu bundle
 
 
Michoacano Tariq MD 18 1710:
Attending MD Review Statement
 
Attending Statement
Attending MD Statement: examined this patient, discuss w/resident/PA/NP, agreed 
w/resident/PA/NP, discussed with family, reviewed EMR data (avail), discussed 
with nursing, discussed with case mgmt, amended to note
Attending Assessment/Plan:
The patient was seen and discussed with house staff, family (wife), nursing, and
case management. Clinically improved at present. Alert & oriented. Phos being 
repleted. OK to transfer to general medicine. PT evaluation- titrate oxygen.

## 2018-04-19 VITALS — SYSTOLIC BLOOD PRESSURE: 112 MMHG | DIASTOLIC BLOOD PRESSURE: 78 MMHG

## 2018-04-19 VITALS — SYSTOLIC BLOOD PRESSURE: 120 MMHG | DIASTOLIC BLOOD PRESSURE: 80 MMHG

## 2018-04-19 VITALS — SYSTOLIC BLOOD PRESSURE: 120 MMHG | DIASTOLIC BLOOD PRESSURE: 86 MMHG

## 2018-04-19 LAB
ABSOLUTE GRANULOCYTE CT: 8.4 /CUMM (ref 1.4–6.5)
BASOPHILS # BLD: 0.1 /CUMM (ref 0–0.2)
BASOPHILS NFR BLD: 0.5 % (ref 0–2)
EOSINOPHIL # BLD: 0.1 /CUMM (ref 0–0.7)
EOSINOPHIL NFR BLD: 0.7 % (ref 0–5)
ERYTHROCYTE [DISTWIDTH] IN BLOOD BY AUTOMATED COUNT: 14.6 % (ref 11.5–14.5)
GRANULOCYTES NFR BLD: 70.6 % (ref 42.2–75.2)
HCT VFR BLD CALC: 43.8 % (ref 42–52)
LYMPHOCYTES # BLD: 2.4 /CUMM (ref 1.2–3.4)
MCH RBC QN AUTO: 28.7 PG (ref 27–31)
MCHC RBC AUTO-ENTMCNC: 32.9 G/DL (ref 33–37)
MCV RBC AUTO: 87.2 FL (ref 80–94)
MONOCYTES # BLD: 0.9 /CUMM (ref 0.1–0.6)
PLATELET # BLD: 209 /CUMM (ref 130–400)
PMV BLD AUTO: 9.9 FL (ref 7.4–10.4)
RED BLOOD CELL CT: 5.02 /CUMM (ref 4.7–6.1)
WBC # BLD AUTO: 11.9 /CUMM (ref 4.8–10.8)

## 2018-04-19 NOTE — PN- ATT ADDEND
Attending Addendum
Attending Brief Note
Patient seen and examined, currently denies any shortness of breath.  He is at 
his baseline oxygen.  He did require BiPAP last night.
 
Vital Signs
 
 
Date Time Temp Pulse Resp B/P B/P Pulse O2 O2 Flow FiO2
 
     Mean Ox Delivery Rate 
 
04/19 0949      93 Nasal 2.0L 
 
       Cannula  
 
04/19 0800      95 Nasal 2.0L 
 
       Cannula  
 
04/19 0710 97.6 59 18 112/78  96   
 
04/19 0014  79    95   
 
04/19 0000      95 Nasal 2.0L 
 
       Cannula  
 
04/18 2307  94    97   
 
04/18 2149 98.3 95 19 114/82  96 Nasal 3.0L 
 
       Cannula  
 
04/18 1600       Nasal 2.0L 
 
       Cannula  
 
 
on exam;
aox3, nad. 
cv; s1,s2, rrr
resp; clear b/l
abd; soft, nt, bs+
ext; no edema
 
 Laboratory Tests
 
 
 04/19 0625
 
Chemistry 
 
  Sodium (137 - 145 mmol/L) 143
 
  Potassium (3.5 - 5.1 mmol/L) 4.2
 
  Chloride (98 - 107 mmol/L) 98
 
  Carbon Dioxide (22 - 30 mmol/L) 37  H
 
  Anion Gap (5 - 16) 8
 
  BUN (9 - 20 mg/dL) 22  H
 
  Creatinine (0.7 - 1.2 mg/dL) 0.6  L
 
  Estimated GFR (>60 ml/min) > 60
 
  BUN/Creatinine Ratio (7 - 25 %) 36.7  H
 
  Phosphorus (2.5 - 4.5 mg/dL) 3.6
 
Hematology 
 
  CBC w Diff NO MAN DIFF REQ
 
  WBC (4.8 - 10.8 /CUMM) 11.9  H
 
  RBC (4.70 - 6.10 /CUMM) 5.02
 
  Hgb (14.0 - 18.0 G/DL) 14.4
 
  Hct (42 - 52 %) 43.8
 
  MCV (80.0 - 94.0 FL) 87.2
 
  MCH (27.0 - 31.0 PG) 28.7
 
  MCHC (33.0 - 37.0 G/DL) 32.9  L
 
  RDW (11.5 - 14.5 %) 14.6  H
 
  Plt Count (130 - 400 /CUMM) 209
 
  MPV (7.4 - 10.4 FL) 9.9
 
  Gran % (42.2 - 75.2 %) 70.6
 
  Lymphocytes % (20.5 - 51.1 %) 20.2  L
 
  Monocytes % (1.7 - 9.3 %) 8.0
 
  Eosinophils % (0 - 5 %) 0.7
 
  Basophils % (0.0 - 2.0 %) 0.5
 
  Absolute Granulocytes (1.4 - 6.5 /CUMM) 8.4  H
 
  Absolute Lymphocytes (1.2 - 3.4 /CUMM) 2.4
 
  Absolute Monocytes (0.10 - 0.60 /CUMM) 0.9  H
 
  Absolute Eosinophils (0.0 - 0.7 /CUMM) 0.1
 
  Absolute Basophils (0.0 - 0.2 /CUMM) 0.1
 
 
A/P; 72 year old man w/a PMHx of severe COPD (2 L nocturnal), restrictive lung 
disesase, chronic respiratory failure, diaphragmatic paralysis (left side),  Rt 
TKR s/p knee replacement 2017), hyperlipidemia, degenerative disease with severe
central stenosis and cord compression status post surgical decompression surgery
, admitted with altered mental state and hypercarbic respiratory failure.
Currently patient improving.  He did use BiPAP overnight.  Pulmonology 
recommends watching him off of BiPAP tonight.  Checking overnight pulse oximetry
on room air and if need be then start the patient on oxygen and check the pulse 
oximetry done.  Patient has been switched on oral prednisone and oral 
antibiotics.  Continue TRC nebs.  Continue other medications and please add 
inhalers as recommended by pulmonology.  Patient on heparin subcutaneous for DVT
prophylaxis.
Possible discharge tomorrow pending overnight pulse oximetry.

## 2018-04-19 NOTE — PN- PULMONARY
Subjective
HPI/Critical Care Issues:
Doing better
no wheezing
walking
sat on walking 93
 
Objective
Current Medications:
 Current Medications
 
 
  Sig/Ariadna Start time  Last
 
Medication Dose Route Stop Time Status Admin
 
Albuterol Sulfate 3 ML Q4P PRN 04/16 2145 AC 
 
  INH   
 
Albuterol Sulfate 3 ML Q4H PRN 04/16 1830 AC 
 
  INH   
 
Cephalexin 500 MG Q6 04/18 1800 AC 04/19
 
  PO   0540
 
Heparin Sodium  5,000 UNIT Q8 04/16 2200 AC 04/19
 
(Porcine)  SC   0540
 
Methylprednisolone 40 MG Q12 04/17 2100 DC 04/18
 
  IV   0832
 
Phosphate 250 MG PC AND AT BEDTIME 04/18 0900 AC 04/19
 
  PO   0809
 
Prednisone 40 MG DAILY 04/19 0900 AC 04/19
 
  PO   0809
 
 
 
 
Vital Signs & I&O
Last 24 Hrs of Vitals and I&O:
 Vital Signs
 
 
Date Time Temp Pulse Resp B/P B/P Pulse O2 O2 Flow FiO2
 
     Mean Ox Delivery Rate 
 
04/19 0949      93 Nasal 2.0L 
 
       Cannula  
 
04/19 0800      95 Nasal 2.0L 
 
       Cannula  
 
04/19 0710 97.6 59 18 112/78  96   
 
04/19 0014  79    95   
 
04/19 0000      95 Nasal 2.0L 
 
       Cannula  
 
04/18 2307  94    97   
 
04/18 2149 98.3 95 19 114/82  96 Nasal 3.0L 
 
       Cannula  
 
04/18 1600       Nasal 2.0L 
 
       Cannula  
 
04/18 1242 99.0 98 20 118/60  95 Nasal 2.0L 
 
       Cannula  
 
 
 Intake & Output
 
 
 04/19 1600 04/19 0800 04/19 0000
 
Intake Total  360 720
 
Output Total  400 
 
Balance  -40 720
 
    
 
Intake, Oral  360 720
 
Number   0
 
Bowel   
 
Movements   
 
Output, Urine  400 
 
 
 
 
Impression/Plan
 
Impression/Plan
Impression/Plan:
General Appearance Alert, Oriented X3, Cooperative, No Acute Distress, 
Skin No Rashes
Skin Temp/Moisture Exam: Warm/Dry
HEENT Atraumatic, PERRLA, EOMI, Mucous Membr. moist/pink
Neck Supple
Lymphatic No cervical lymphadenopathy 
Cardiovascular Regular Rate, Normal S1, Normal S2, No Murmurs
Lungs Clear to Auscultation, Decrease air entery of left lung 
Abdomen Normal Bowel Sounds, Soft, No Tenderness
Neurological Normal Speech, Strength at 5/5 X4 Ext, Normal Tone, Sensation 
Intact, Cranial Nerves 3-12 NL, Hyperreflexia x4 clonus fasculation 
Extremities No Clubbing, No Cyanosis, No Edema, Normal Pulses
 
IMPRESSION
 
PT with recent extensive cervical spine surg for severe djd, previous left sided
diapharam paralysis with history of sig smoking, COPD with mixed obstructive 
restrictive lung disease, syncope, with 
 
* Improving Hypoxia and acute on chronic hypercarbic resp failure - 
multifactorial - has  chronic compensated resp failure due to bilateral lower 
lobe pulm contrast enhancing infiltrates highly sugg of resolving chronic 
atelectasis from his diaphramatic paralysis especially in the left side. PET nil
acute,with prob exacerbation due to cymbalta and copde and pt does have prob 
central apnea as well
 
* Cervical disc disease /  s/p surg as he had quadreperesis before with good 
recovery of function
 
* Atelectasis bibasilar due to cervical dz causeing diapharm dysfunction and 
left diapharamatic paralysis
 
* COPD fev1 of 1.49 litres, with mild copde
 
* Cellulitis foot
 
* History of smoking 30 or more pack years quit 2016
 
* Personal history of malignant neoplasm of prostate s/p surg needs follw up
 
* Mixed restrictive and obstructive lung disease 
 
* recent colon polyp removal
REC
cont current rx
Prednisone 30 for two days and taper
Nebs prn
Spiriva one puff daily
Start symbicort bid
Hold bipap tonight 
Please order nocturnal oxymetry on ROOM AIR TO BEGIN with and if has sig desat 
for more than 5 mins then do nocturnal oxymetry with oxygen
ABG this pm to assess baselin pco2
Will follow

## 2018-04-19 NOTE — PATIENT DISCHARGE INSTRUCTIONS
Discharge Instructions
 
General Discharge Information
You were seen/treated for:
#1 Hypercarbic respiratory failure 
#2 History of COPD 
#3 mixed restrictive/obstructive lung disease
#4 Left diaphramatic paralysis 
#5 History of prostate cancer
#6 Hypernatremia - resolved
#7 hypophosphatemia - resolved
Special Instructions:
Please follow-up with your PCP within a week after discharge.
 
Please follow-up with your pulmonologist within a week after discharge. Please 
have Dr. Dozier schedule an outpatient sleep study. You can continue your normal 
oxygen regimen.
 
Please take all your medications including antibiotics as directed.
 
Diet
Continue normal diet: Yes
 
Activity
Full Activity/No Limits: Yes
 
Acute Coronary Syndrome
 
Inclusion Criteria
At DC or during hospital stay patient has or had the following:
ACS DIAGNOSIS No
 
Discharge Core Measures
Meds if any: Prescribed or Continued at Discharge
Meds if any: NOT Prescribed or Continued at Discharge
 
Congestive Heart Failure
 
Inclusion Criteria
At DC or during hospital stay patient has or had the following:
CHF DIAGNOSIS No
 
Discharge Core Measures
Meds if any: Prescribed or Continued at Discharge
Meds if any: NOT Prescribed or Continued at Discharge
 
Cerebrovascular accident
 
Inclusion Criteria
At DC or during hospital stay patient has or had the following:
CVA/TIA Diagnosis No
 
Discharge Core Measures
Meds if any: Prescribed or Continued at Discharge
Meds if any: NOT Prescribed or Continued at Discharge
 
Venous thromboembolism
 
Inclusion Criteria
VTE Diagnosis No
VTE Type NONE
VTE Confirmed by (Test) DUPLEX SCAN LOWER EXT
 
Discharge Core Measures
- Per Current guidelines, there needs to be overlap
- treatment for the first 5 days of Warfarin therapy.
- If discharged on Warfarin prior to 5 days of
- overlap therapy, the patient will need to be
- assessed for post discharge needs including
- *Post discharge parental anticoagulation
- *Warfarin and/or parental anticoagulation education
- *Follow up date to check INR post discharge
At least 5 days overlap therapy as Inpatient No
Meds if any: Prescribed or Continued at Discharge
Note: Overlap Therapy is Warfarin and Anticoagulant
Meds if any: NOT Prescribed or Continued at Discharge

## 2018-04-19 NOTE — PN- HOUSESTAFF
Subjective
Follow-up For:
# Hypercarbic respiratory failure 
# History of COPD 
# Left Foot Cellulitis
# mixed restrictive/obstructive lung disease
# Left diaphramatic paralysis 
# History of prostate cancer
# Hypernatremia - resolved
# Hypophosphatemia - resolved
 
Subjective:
No acute events overnight.  Patient states that his breathing is better and 
thinks is currently baseline.  When asked by his altered mental status the 
patient states that he was never altered.
 
Review of Systems
Constitutional:
Reports: see HPI. 
 
Objective
Last 24 Hrs of Vital Signs/I&O
 Vital Signs
 
 
Date Time Temp Pulse Resp B/P B/P Pulse O2 O2 Flow FiO2
 
     Mean Ox Delivery Rate 
 
 1343 97.8 101 20 120/80  95 Nasal  
 
       Cannula  
 
 0949      93 Nasal 2.0L 
 
       Cannula  
 
 0800      95 Nasal 2.0L 
 
       Cannula  
 
 0710 97.6 59 18 112/78  96   
 
 0014  79    95   
 
 0000      95 Nasal 2.0L 
 
       Cannula  
 
 2307  94    97   
 
 2149 98.3 95 19 114/82  96 Nasal 3.0L 
 
       Cannula  
 
 1600       Nasal 2.0L 
 
       Cannula  
 
 
 Intake & Output
 
 
  1600  0800  0000
 
Intake Total 820 360 720
 
Output Total 650 400 
 
Balance 170 -40 720
 
    
 
Intake, IV 20  
 
Intake, Oral 800 360 720
 
Number 0  0
 
Bowel   
 
Movements   
 
Output, Urine 650 400 
 
 
 
 
Physical Exam
General Appearance: Alert, Oriented X3, Cooperative
Lungs: Clear to Auscultation, decreased air movement
Abdomen: Normal Bowel Sounds, Soft, No Tenderness
Extremities: 2+ radial pulses
Current Medications:
 Current Medications
 
 
  Sig/Ariadna Start time  Last
 
Medication Dose Route Stop Time Status Admin
 
Albuterol Sulfate 3 ML Q4P PRN  2145 AC 
 
  INH   
 
Albuterol Sulfate 3 ML Q4H PRN  1830 AC 
 
  INH   
 
Budesonide/ 2 PUF BID  1200 AC 
 
Formoterol Fumarate  INH   1332
 
Cephalexin 500 MG Q6  1800 AC 
 
  PO   1218
 
Heparin Sodium  5,000 UNIT Q8  2200 AC 
 
(Porcine)  SC   1332
 
Phosphate 250 MG PC AND AT BEDTIME  0900 AC 
 
  PO   1218
 
Prednisone 30 MG DAILY  09 AC 
 
  PO  0859  
 
Prednisone 40 MG DAILY  0900 DC 
 
  PO   0809
 
Tiotropium [place] 1 PUF DAILY  1156 AC 
 
  INH   1332
 
 
 
 
Last 24 Hrs of Lab/Ned Results
Last 24 Hrs of Labs/Mics:
 Laboratory Tests
 
18 0625:
Anion Gap 8, Estimated GFR > 60, BUN/Creatinine Ratio 36.7  H, Phosphorus 3.6, 
CBC w Diff NO MAN DIFF REQ, RBC 5.02, MCV 87.2, MCH 28.7, MCHC 32.9  L, RDW 14.6
 H, MPV 9.9, Gran % 70.6, Lymphocytes % 20.2  L, Monocytes % 8.0, Eosinophils % 
0.7, Basophils % 0.5, Absolute Granulocytes 8.4  H, Absolute Lymphocytes 2.4, 
Absolute Monocytes 0.9  H, Absolute Eosinophils 0.1, Absolute Basophils 0.1
 
 
Assessment/Plan
Assessment:
72 year old man w/a PMHx of severe COPD (2 L nocturnal), chronic respiratory 
failure, diaphragmatic paralysis (left side),  Rt TKR s/p knee replacement )
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 
compression  status post surgical decompression( dx'ed 2017) , was brought to
the hospital with a chief concern of AMS a few hours prior to presentation likey
due to Hypercarbic respiratory failure from diaphragmatic paralysis exacerbated 
by medication use and/or COPD. 
 
Patient is admitted to the ICU for management of the following issues;
 
#Hypercarbic respiratory failure 2/2 to COPD vs L sided diaphragmatic paralysis.
ABGs at the time of admission showed a pH of 7.19 and with PCO2 of 109 and 
bicarbonate 41.  She was started on BiPAP with 55% oxygen, repeat ABGs couple of
hour liters showed improvement with a pH of 7.46 PCO2 41 and bicarbonate 28.  
 
-Start Symbicort, Spiriva

-ABG at 10 PM
-monitor phosphorous, which could affect the diaphragmatic functioning and be 
responsible for patient's current respiratory failure.
-currently back to baseline 2L
-Continue prednisone taper
- Monitor off antibiotics
 
#Left foot cellulitis;
Patient's left foot is swollen and erythematous, per the wife swelling has 
always been there but erythema is new.  Denies any trauma.  
Left lower extremity Doppler negative for DVT
Foot x-ray negative for any soft tissue infection or fractures.
 
-Patient remains afebrile. WBC 9.4-11.9 (could be from steroid use).
-continue Keflex 500 mg every 6 for presumed cellulitis.
 
 
#Hyperlipidemia
- Continue Rosuvastatin.
 
#Hypomagnesemia;
- Continue to monitor and replete accordingly. 
 
#Hyperbilirubinemia - resolved
1.4 -> .9
- RUQ ultrasound negative for any acute pathology.
 
#DVT Prophylaxis
-S/C Lovenox
 
#DNR/DNI
Problem List:
 1. Hypercapnic respiratory failure
 
 2. Cellulitis
 
Pain Ratin
Pain Location:
none
Pain Goal: Pain 4 or less
Pain Plan:
pathway
Tomorrow's Labs & Rationales:
cbc
mg
phos

## 2018-04-20 VITALS — SYSTOLIC BLOOD PRESSURE: 124 MMHG | DIASTOLIC BLOOD PRESSURE: 84 MMHG

## 2018-04-20 LAB
ABSOLUTE GRANULOCYTE CT: 7.1 /CUMM (ref 1.4–6.5)
BASOPHILS # BLD: 0 /CUMM (ref 0–0.2)
BASOPHILS NFR BLD: 0.4 % (ref 0–2)
EOSINOPHIL # BLD: 0.1 /CUMM (ref 0–0.7)
EOSINOPHIL NFR BLD: 0.7 % (ref 0–5)
ERYTHROCYTE [DISTWIDTH] IN BLOOD BY AUTOMATED COUNT: 14.5 % (ref 11.5–14.5)
GRANULOCYTES NFR BLD: 68.9 % (ref 42.2–75.2)
HCT VFR BLD CALC: 45.2 % (ref 42–52)
LYMPHOCYTES # BLD: 2.1 /CUMM (ref 1.2–3.4)
MCH RBC QN AUTO: 28.8 PG (ref 27–31)
MCHC RBC AUTO-ENTMCNC: 33 G/DL (ref 33–37)
MCV RBC AUTO: 87.2 FL (ref 80–94)
MONOCYTES # BLD: 0.9 /CUMM (ref 0.1–0.6)
PLATELET # BLD: 209 /CUMM (ref 130–400)
PMV BLD AUTO: 9.7 FL (ref 7.4–10.4)
RED BLOOD CELL CT: 5.19 /CUMM (ref 4.7–6.1)
WBC # BLD AUTO: 10.3 /CUMM (ref 4.8–10.8)

## 2018-04-20 NOTE — PN- HOUSESTAFF
Subjective
Follow-up For:
# Hypercarbic respiratory failure 
# History of COPD 
# Left Foot Cellulitis
# mixed restrictive/obstructive lung disease
# Left diaphramatic paralysis 
# History of prostate cancer
# Hypernatremia - resolved
# Hypophosphatemia - resolved
Subjective:
No acute events overnight.  Patient requesting discharge.
 
Review of Systems
Constitutional:
Reports: see HPI. 
 
Objective
Last 24 Hrs of Vital Signs/I&O
 Vital Signs
 
 
Date Time Temp Pulse Resp B/P B/P Pulse O2 O2 Flow FiO2
 
     Mean Ox Delivery Rate 
 
 1030      94 Nasal 2.0L 
 
       Cannula  
 
 0634 97.6 81 20 124/84  97   
 
 0000       Nasal 2.0L 
 
       Cannula  
 
 2147 97.9 94 19 120/86  97 Nasal 2.0L 
 
       Cannula  
 
 1924      97 Nasal 2.0L 
 
       Cannula  
 
 
 Intake & Output
 
 
  1600  0800  0000
 
Intake Total 560  
 
Output Total  200 
 
Balance 560 -200 
 
    
 
Intake, Oral 560  
 
Number 1  1
 
Bowel   
 
Movements   
 
Output, Urine  200 
 
 
 
 
Physical Exam
General Appearance: Alert, Oriented X3, Cooperative
Cardiovascular: tachy
Lungs: decreased breath sounds
Abdomen: Normal Bowel Sounds, Soft, No Tenderness
Extremities: no lower extremity edema
Current Medications:
 Current Medications
 
 
  Sig/Ariadna Start time  Last
 
Medication Dose Route Stop Time Status Admin
 
Albuterol Sulfate 3 ML Q4P PRN  2145 DCD 
 
  INH   
 
Albuterol Sulfate 3 ML Q4H PRN  1830 DCD 
 
  INH   
 
Budesonide/ 2 PUF BID  1200 DCD 
 
Formoterol Fumarate  INH   1022
 
Cephalexin 500 MG Q6  1800 DCD 
 
  PO   1239
 
Heparin Sodium  5,000 UNIT Q8  2200 DCD 
 
(Porcine)  SC   0601
 
Patient Medication  1 ED ONE ONE 1945 DC 
 
Teaching  ED  194  0702
 
Phosphate 250 MG PC AND AT BEDTIME  0900 DC 
 
  PO   2236
 
Prednisone 30 MG DAILY  0900 DCD 
 
  PO  0859  1020
 
Tiotropium [place] 1 PUF DAILY  1156 DCD 
 
  INH   1021
 
 
 
 
Last 24 Hrs of Lab/Ned Results
Last 24 Hrs of Labs/Mics:
 Laboratory Tests
 
18 0800:
Pulse Ox Probe Site RMD, ABG O2 Sat Calc/Ishmael 94.0, O2 Concentration % 2L, O2 
Delivery Method NC
 
18 0600:
Phosphorus 3.9, Magnesium 1.7, CBC w Diff NO MAN DIFF REQ, RBC 5.19, MCV 87.2, 
MCH 28.8, MCHC 33.0, RDW 14.5, MPV 9.7, Gran % 68.9, Lymphocytes % 20.8, 
Monocytes % 9.2, Eosinophils % 0.7, Basophils % 0.4, Absolute Granulocytes 7.1  
H, Absolute Lymphocytes 2.1, Absolute Monocytes 0.9  H, Absolute Eosinophils 0.1
, Absolute Basophils 0
 
18 2150:
pH 7.42, pCO2 47  H, pO2 88, HCO3 30  H, ABG O2 Sat (Measured) 96.0, P-50 (Temp 
Corrected) N, Carboxyhemoglobin 0.4  L, O2 Concentration % 2L, Temperature 97.9,
O2 Delivery Method NC, Phlebotomy Draw Site RIGHT RADIAL
 
 
Assessment/Plan
Assessment:
72 year old man w/a PMHx of severe COPD (2 L nocturnal), chronic respiratory 
failure, diaphragmatic paralysis (left side),  Rt TKR s/p knee replacement )
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 
compression  status post surgical decompression( dx'ed 2017) , was brought to
the hospital with a chief concern of AMS a few hours prior to presentation likey
due to Hypercarbic respiratory failure from diaphragmatic paralysis exacerbated 
by medication use and/or COPD. 
 
#Hypercarbic respiratory failure 2/2 to COPD vs L sided diaphragmatic paralysis.
ABGs at the time of admission showed a pH of 7.19 and with PCO2 of 109 and 
bicarbonate 41.  She was started on BiPAP with 55% oxygen, repeat ABGs couple of
hour liters showed improvement with a pH of 7.46 PCO2 41 and bicarbonate 28.  
 
-Discharge with Symbicort, Spiriva, prednisone taper
-Patient did not qualify for CPAP after O2 pulse ox test last night.
-Latest ABG: PH 7.2, PCO2 47, PO2 88, bicarbonate 30
-Phosphorus within normal limits
-currently back to baseline 2L
- Monitor off antibiotics
 
#Left foot cellulitis;
Patient's left foot is swollen and erythematous, per the wife swelling has 
always been there but erythema is new.  Denies any trauma.  
Left lower extremity Doppler negative for DVT
Foot x-ray negative for any soft tissue infection or fractures.
 
-Patient remains afebrile. WBC 9.4-11.9 (could be from steroid use).
-Discharge with Keflex 500 mg every 6 to complete a one-week course of total 
antibiotics
 
#Hyperlipidemia
- Continue Rosuvastatin.
 
#Hypomagnesemia;
- Continue to monitor and replete accordingly. 
 
#Hyperbilirubinemia - resolved
1.4 -> .9
- RUQ ultrasound negative for any acute pathology.
 
#DVT Prophylaxis
-S/C Lovenox
 
#DNR/DNI
Problem List:
 1. Hypercapnic respiratory failure
 
 2. Cellulitis
 
Pain Ratin
Pain Location:
none
Pain Goal: Pain 4 or less
Pain Plan:
pain pathway
Tomorrow's Labs & Rationales:
none

## 2018-04-20 NOTE — PN- PULMONARY
Subjective
HPI/Critical Care Issues:
DOing better, off bipap
Abg reviewed Pco2 is 47 less than 50 and does not qualify for NIV
No sig wheezing
Nocturnal oxymetry done and pending result
 
Objective
Current Medications:
 Current Medications
 
 
  Sig/Ariadna Start time  Last
 
Medication Dose Route Stop Time Status Admin
 
Albuterol Sulfate 3 ML Q4P PRN 04/16 2145 AC 
 
  INH   
 
Albuterol Sulfate 3 ML Q4H PRN 04/16 1830 AC 
 
  INH   
 
Budesonide/ 2 PUF BID 04/19 1200 AC 04/19
 
Formoterol Fumarate  INH   2235
 
Cephalexin 500 MG Q6 04/18 1800 AC 04/20
 
  PO   0601
 
Heparin Sodium  5,000 UNIT Q8 04/16 2200 AC 04/20
 
(Porcine)  SC   0601
 
Patient Medication  1 ED ONE ONE 04/19 1945 DC 04/20
 
Teaching  ED 1946  0702
 
Phosphate 250 MG PC AND AT BEDTIME 04/18 0900 AC 04/19
 
  PO   2236
 
Prednisone 30 MG DAILY 04/20 0900 AC 
 
  PO 04/28 0859  
 
Prednisone 40 MG DAILY 04/19 0900 DC 04/19
 
  PO   0809
 
Tiotropium [place] 1 PUF DAILY 04/19 1156 AC 04/19
 
  INH   1332
 
 
 
 
Vital Signs & I&O
Last 24 Hrs of Vitals and I&O:
 Vital Signs
 
 
Date Time Temp Pulse Resp B/P B/P Pulse O2 O2 Flow FiO2
 
     Mean Ox Delivery Rate 
 
04/20 0634 97.6 81 20 124/84  97   
 
04/20 0000       Nasal 2.0L 
 
       Cannula  
 
04/19 2147 97.9 94 19 120/86  97 Nasal 2.0L 
 
       Cannula  
 
04/19 1924      97 Nasal 2.0L 
 
       Cannula  
 
04/19 1600       Nasal 2.0L 
 
       Cannula  
 
04/19 1343 97.8 101 20 120/80  95 Nasal  
 
       Cannula  
 
04/19 0949      93 Nasal 2.0L 
 
       Cannula  
 
 
 Intake & Output
 
 
 04/20 1600 04/20 0800 04/20 0000
 
Intake Total   
 
Output Total  200 
 
Balance  -200 
 
    
 
Number   1
 
Bowel   
 
Movements   
 
Output, Urine  200 
 
 
 
 
Impression/Plan
 
Impression/Plan
Impression/Plan:
General Appearance Alert, Oriented X3, Cooperative, No Acute Distress, 
Skin No Rashes
Skin Temp/Moisture Exam: Warm/Dry
HEENT Atraumatic, PERRLA, EOMI, Mucous Membr. moist/pink
Neck Supple
Lymphatic No cervical lymphadenopathy 
Cardiovascular Regular Rate, Normal S1, Normal S2, No Murmurs
Lungs Clear to Auscultation, Decrease air entery of left lung 
Abdomen Normal Bowel Sounds, Soft, No Tenderness
Neurological Normal Speech, Strength at 5/5 X4 Ext, Normal Tone, Sensation 
Intact, Cranial Nerves 3-12 NL, Hyperreflexia x4 clonus fasculation 
Extremities No Clubbing, No Cyanosis, No Edema, Normal Pulses
 
IMPRESSION
 
PT with recent extensive cervical spine surg for severe djd, previous left sided
diapharam paralysis with history of sig smoking, COPD with mixed obstructive 
restrictive lung disease, syncope, with 
 
* Improving Hypoxia and acute on chronic hypercarbic resp failure - 
multifactorial - has  chronic compensated resp failure due to bilateral lower 
lobe pulm contrast enhancing infiltrates highly sugg of resolving chronic 
atelectasis from his diaphramatic paralysis especially in the left side. PET nil
acute,with prob exacerbation due to cymbalta and copde and pt does have prob 
central apnea as well ( pco2 now less than 47 and does not qualify for niv for 
copd)
 
* Cervical disc disease /  s/p surg as he had quadreperesis before with good 
recovery of function
 
* Atelectasis bibasilar due to cervical dz causeing diapharm dysfunction and 
left diapharamatic paralysis
 
* COPD fev1 of 1.49 litres, with mild copde
 
* Cellulitis foot
 
* History of smoking 30 or more pack years quit 2016
 
* Personal history of malignant neoplasm of prostate s/p surg needs follw up
 
* Mixed restrictive and obstructive lung disease 
 
* recent colon polyp removal
REC
cont current rx
Prednisone 30 for two days and taper 
Nebs prn
Spiriva one puff daily
Start symbicort bid
Please let me know the results fo NOcturnal oxymery
Needs an out pt sleep study
Dc on kcl 20 meq daily
Ok to dc
Pt should avoid all sedative and hypnotics 
WIll follow as out pt

## 2018-04-20 NOTE — PN- ATT ADDEND
Attending Addendum
Attending Brief Note
Patient seen and examined, wanted to know about the results of his overnight 
pulse oximetry.  He currently denies any shortness of breath.  Overnight pulse 
oximetry did mention that he dropped his oxygen but his PCO2 was less than 50.  
As discussed with the pulmonologist Dr. Dozier, patient does not meet the 
criteria for home CPAP.  Patient has home oxygen will be discharged home with 
his home oxygen.  He will also be going home on prednisone taper, by mouth 
Keflex.  Cannot also recommends keeping the patient on some potassium.  He will 
be given prescription for Symbicort and Spiriva as recommended by pulmonologist.
 Patient to follow-up with his primary care doctor and Dr. Dozier as an 
outpatient.

## 2018-04-24 ENCOUNTER — HOSPITAL ENCOUNTER (INPATIENT)
Dept: HOSPITAL 68 - ERH | Age: 73
LOS: 3 days | Discharge: HOME HEALTH | DRG: 189 | End: 2018-04-27
Attending: INTERNAL MEDICINE | Admitting: INTERNAL MEDICINE
Payer: COMMERCIAL

## 2018-04-24 VITALS — HEIGHT: 66 IN | WEIGHT: 155 LBS | BODY MASS INDEX: 24.91 KG/M2

## 2018-04-24 VITALS — SYSTOLIC BLOOD PRESSURE: 100 MMHG | DIASTOLIC BLOOD PRESSURE: 60 MMHG

## 2018-04-24 DIAGNOSIS — J44.9: ICD-10-CM

## 2018-04-24 DIAGNOSIS — L03.116: ICD-10-CM

## 2018-04-24 DIAGNOSIS — Z99.81: ICD-10-CM

## 2018-04-24 DIAGNOSIS — M50.30: ICD-10-CM

## 2018-04-24 DIAGNOSIS — E78.5: ICD-10-CM

## 2018-04-24 DIAGNOSIS — G70.9: ICD-10-CM

## 2018-04-24 DIAGNOSIS — R00.0: ICD-10-CM

## 2018-04-24 DIAGNOSIS — J96.22: Primary | ICD-10-CM

## 2018-04-24 DIAGNOSIS — Z86.010: ICD-10-CM

## 2018-04-24 DIAGNOSIS — Z96.651: ICD-10-CM

## 2018-04-24 DIAGNOSIS — J96.21: ICD-10-CM

## 2018-04-24 DIAGNOSIS — Z85.46: ICD-10-CM

## 2018-04-24 DIAGNOSIS — Z88.5: ICD-10-CM

## 2018-04-24 DIAGNOSIS — Z87.891: ICD-10-CM

## 2018-04-24 DIAGNOSIS — T38.0X5A: ICD-10-CM

## 2018-04-24 DIAGNOSIS — Z85.828: ICD-10-CM

## 2018-04-24 DIAGNOSIS — M47.892: ICD-10-CM

## 2018-04-24 DIAGNOSIS — K59.09: ICD-10-CM

## 2018-04-24 DIAGNOSIS — J98.6: ICD-10-CM

## 2018-04-24 DIAGNOSIS — Z90.79: ICD-10-CM

## 2018-04-24 DIAGNOSIS — Z91.011: ICD-10-CM

## 2018-04-24 DIAGNOSIS — D72.829: ICD-10-CM

## 2018-04-24 LAB
ABSOLUTE GRANULOCYTE CT: 11.6 /CUMM (ref 1.4–6.5)
BASOPHILS # BLD: 0 /CUMM (ref 0–0.2)
BASOPHILS NFR BLD: 0.1 % (ref 0–2)
EOSINOPHIL # BLD: 0 /CUMM (ref 0–0.7)
EOSINOPHIL NFR BLD: 0.1 % (ref 0–5)
ERYTHROCYTE [DISTWIDTH] IN BLOOD BY AUTOMATED COUNT: 15.6 % (ref 11.5–14.5)
GRANULOCYTES NFR BLD: 90.9 % (ref 42.2–75.2)
HCT VFR BLD CALC: 47.4 % (ref 42–52)
LYMPHOCYTES # BLD: 0.6 /CUMM (ref 1.2–3.4)
MCH RBC QN AUTO: 28.5 PG (ref 27–31)
MCHC RBC AUTO-ENTMCNC: 31.9 G/DL (ref 33–37)
MCV RBC AUTO: 89.4 FL (ref 80–94)
MONOCYTES # BLD: 0.5 /CUMM (ref 0.1–0.6)
PLATELET # BLD: 288 /CUMM (ref 130–400)
PMV BLD AUTO: 8.9 FL (ref 7.4–10.4)
RED BLOOD CELL CT: 5.3 /CUMM (ref 4.7–6.1)
WBC # BLD AUTO: 12.8 /CUMM (ref 4.8–10.8)

## 2018-04-24 PROCEDURE — 5A09357 ASSISTANCE WITH RESPIRATORY VENTILATION, LESS THAN 24 CONSECUTIVE HOURS, CONTINUOUS POSITIVE AIRWAY PRESSURE: ICD-10-PCS | Performed by: INTERNAL MEDICINE

## 2018-04-24 PROCEDURE — 2NBSP: CPT

## 2018-04-24 NOTE — RADIOLOGY REPORT
EXAMINATION:
XR PORTABLE CHEST
 
CLINICAL INFORMATION:
Dyspnea. Evaluate for congestive heart failure.
 
COMPARISON:
Chest radiograph 04/13/2018.
 
TECHNIQUE:
Portable frontal view of the chest was obtained.
 
FINDINGS:
Lung volumes are low. There are coarse linear markings within the left lung
base that remain essentially unchanged when compared to the recent prior
radiograph from 04/13/2018. These findings may represent a manifestation of
subsegmental atelectasis versus or scar. No overt consolidative disease. No
pneumothorax. The cardiac silhouette and upper mediastinal contours are
normal. No acute osseous finding.
 
IMPRESSION:
Low lung volumes and subsegmental atelectasis or scar involving the left
lower lobe. No overt airspace disease.

## 2018-04-24 NOTE — ED AMS/SEIZURE/WEAK/DIZZY
History of Present Illness
 
General
Chief Complaint: General Adult
Stated Complaint: SOB,LETHARGY
Source: patient, family, old records
Exam Limitations: no limitations
Allergies
Coded Allergies:
morphine (Severe, VOMITING 18)
lactose (GI UPSET 17)
 
Reconcile Medications
Albuterol Sulfate (Proair Hfa) 90 MCG HFA.AER.AD   2 PUF INH Q4-6 PRN PRN copd  
(Reported)
Budesonide/Formoterol Fumarate (Symbicort 160-4.5 Mcg Inhaler) 160 MCG-4.5 MCG/
ACTUATION HFA.AER.AD   2 PUF INH BID COPD
Cephalexin (Keflex) 500 MG CAPSULE   1 CAP PO Q6 CELLULITIS
Cholecalciferol (Vitamin D3) 1,000 UNIT TABLET   1 TAB PO DAILY SUPPLEMENT  (
Reported)
Cyanocobalamin (Vitamin B-12) 1,000 MCG TABLET   1 TAB PO DAILY MENTAL HEATLH  (
Reported)
Docusate Sodium 100 MG CAPSULE   100 MG PO BID PRN CONSTIPATION
     STOOL SOFTENER, AVAILABLE OVER THE COUNTER
Glycopyrrolate/Formoterol Fum (Bevespi Aerosphere Inhaler) 9 MCG-4.8 MCG 
HFA.AER.AD   2 PUFF INH BID BREATHING PROBLEMS  (Reported)
Multivitamin (One Daily Multivitamin) 1 EACH TABLET   1 TAB PO DAILY VITAMIN 
SUPPORT  (Reported)
Omega-3 Fatty Acids/Fish Oil (Fish Oil 1,000 MG Capsule) 340 MG-1,000 MG CAPSULE
  1 CAP PO DAILY SUPPLEMENT  (Reported)
Potassium Chloride (Klor-Con) 20 MEQ PACKET   1 PAC PO DAILY POTASSIUM
Prednisone 10 MG TABLET   1 TAB PO DAILY SHORTNESS OF BREATH
     DATE        TAB
             3
     -     2
     -     1
Rosuvastatin Calcium (Crestor) 10 MG TABLET   1 TAB PO DAILY CHOLESTEROL  (
Reported)
Ubidecarenone (Coenzyme Q-10) 200 MG CAPSULE   1 TAB PO DAILY HEART HEALTH  (
Reported)
 
Triage Note:
PT TO ED WITH WIFE FOR WEAKNESS, SOB, LETHARGY.
 PT WAS ADMITTED TO ICU ON , WENT HOME ON .
 PHYSICAL THERAPY WAS AT PT'S HOUSE AND HIS O2 WAS
 LOW AND PT WAS FALLING ASLEEP WHILE TALKING.
 O2 SATS ON 2L, 91%.
Triage Nurses Notes Reviewed? yes
Onset: Abrupt
Duration: day(s): (1), constant
Timing: recent history
Injury Environment: home
Severity: moderate, severe
Severity Numbers: 10
No Modifying Factors: none
Associated Symptoms: DENIES
HPI:
 
72 year old man w/a PMHx of severe COPD (2 L nocturnal), chronic respiratory 
failure, diaphragmatic paralysis (left side),  Rt TKR s/p knee replacement )
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 
compression  status post surgical decompression presents to the ER for 
evaluation with his wife.  The patient was discharged from this hospital 4 days 
ago status post hypercarbic respiratory failure he is currently on prednisone.  
His wife states that he did not call 5 for BiPAP at home.  He is been using 2 L 
of oxygen sporadically as needed.  Today when the physical therapist came to 
evaluate him the patient was dozing off midsentence and advised that they come 
to the ER.  His wife states he's had similar episodes before which time his 
carbon dioxide was elevated.  He denies any chest pain cough fever chills leg 
swelling.  He does not have a nebulizer machine at home is been using his 
inhalers as directed.  
 
 
(Mane SANTOS,Grady)
 
Vital Signs & Intake/Output
Vital Signs & Intake/Output
 Vital Signs
 
 
Date Time Temp Pulse Resp B/P B/P Pulse O2 O2 Flow FiO2
 
     Mean Ox Delivery Rate 
 
 1738 97.0 73 18 139/81  95 BIPAP 30% 
 
 1636  87 18 162/78  97 BIPAP  
 
 1602      96   
 
 1447  78    99   
 
 1446      94 Nasal 2.0L 
 
       Cannula  
 
 1405 96.0 96 20 182/88  91 Nasal 2.0L 
 
       Cannula  
 
 
 
(Juani RODRÍGUEZ,Kyle HARRISON)
 
Past History
 
Travel History
Traveled to [place] past 21 day No
 
Medical History
Any Pertinent Medical History? see below for history
Neurological: NONE
EENT: NONE
Cardiovascular: HYPERCHOLESTERMIA
Respiratory: COPD, 2L AT BASE DIAPHRAGMATIC PARALYSIS  ON L SIDE
Gastrointestinal: NONE
Hepatic: NONE
Renal: NONE
Musculoskeletal: degen joint disease, L SIDE WEAK
Psychiatric: NONE
Endocrine: NONE
Blood Disorders: NONE
Cancer(s): prostate cancer, SKIN CA
GYN/Reproductive: NONE
History of MRSA: No
History of VRE: No
History of CDIFF: No
Influenza Vaccine: 17
Tetanus Vaccine: 18
 
Surgical History
Surgical History: TONSILLECTOMY PROSTATECTOMY ULNAR NERVE REPAIR SKIN CA 
EXCISION TKR RIGHT LAMINECTOMY
 
Psychosocial History
Who do you live with Spouse
What is your primary language English
Tobacco Use: Quit >30 days ago
ETOH Use: denies use
Illicit Drug Use: denies illicit drug use
 
Family History
Hx Contributory? No
(Grady Alcantara)
 
Review of Systems
 
Review of Systems
Constitutional:
Reports: see HPI. 
Comments
Review of systems: See HPI, All other systems negative.
Constitutional, no chills no fever, no malaise no weight loss
HEENT:  no sore throat no congestion, no ear pain
Cardiovascular: No chest pain , no palpitation
Skin:  no rashes, no change in skin
Respiratory: No dyspnea no cough no  sputum no  hemoptysis
GI: No nausea no vomiting, no diarrhea, no bloating/constipation
: No dysuria No hematuria, no frequency
Muscle skeletal: No joint pain, no back pain, no neck pain,
Neurologic: , no headache
Psych: No stress no  depression,.
Heme/endocrine: No bruising no bleeding
Immunology: No lymphadenopathy
(Grady Alcantara)
 
Physical Exam
 
Physical Exam
General Appearance: well developed/nourished, awake
Comments:
Well-developed well-nourished person in no acute distress
HEENT: Normal EENT exam; PERRL, EOMI, no nystagmus. HEAD is atraumatic. moist 
mucous membranes.  
Neck: Supple, y, normal range of motion 
Back: Full range of motion
Cardiovascular: Regular rate and rhythms no murmurs rubs or gallops, normal JVP
Respiratory: Chest nontender.There were no bony deformities, no asymmetry. No 
respiratory distress.  Patient speaking in full complete sentences diminished 
breath sounds bilaterally
Abdomen: Soft, nontender nondistended, no appreciable organomegaly. Normal bowel
sounds. No rebound/guarding, 
Extremity: No edema, full range of motion of extremities
Neuro: Alert oriented x3, motor sensory normal, There were no obvious focal 
neurologic abnormalities.
Skin: No appreciable rash on exposed skin, skin is warm and dry.
Psych: Mood and affect is normal, memory and judgment is normal.
 
Core Measures
ACS in differential dx? No
CVA/TIA Diagnosis No
Sepsis Present: No
Sepsis Focused Exam Completed? No
(Grady Alcantara)
 
Progress
Differential Diagnosis: arrythmia, PNA, CHF, RESP FAILURE, SEPSIS, ELECTROLYTE 
ABNORMALITY
Diagnostic Imaging:
Viewed by Me: Radiology Read.  Discussed w/RAD: Radiology Read. 
Radiology Impression: PATIENT: MICAH TRISTAN JR  MEDICAL RECORD NO: [id number] 
PRESENT AGE: 72  PATIENT ACCOUNT NO: [id number] : 46  LOCATION: [facility] 
ORDERING PHYSICIAN: Grady SANTOS     SERVICE DATE:  EXAM TYPE: 
RAD - XRY-PORTABLE CHEST XRAY EXAMINATION: XR PORTABLE CHEST CLINICAL 
INFORMATION: Dyspnea. Evaluate for congestive heart failure. COMPARISON: Chest 
radiograph 2018. TECHNIQUE: Portable frontal view of the chest was 
obtained. FINDINGS: Lung volumes are low. There are coarse linear markings 
within the left lung base that remain essentially unchanged when compared to the
recent prior radiograph from 2018. These findings may represent a 
manifestation of subsegmental atelectasis versus or scar. No overt consolidative
disease. No pneumothorax. The cardiac silhouette and upper mediastinal contours 
are normal. No acute osseous finding. IMPRESSION: Low lung volumes and 
subsegmental atelectasis or scar involving the left lower lobe. No overt 
airspace disease. DICTATED BY: Osmany Rubin MD  DATE/TIME DICTATED:18 :RAD.JOHNSON  DATE/TIME TRANSCRIBED:18 
CONFIDENTIAL, DO NOT COPY WITHOUT APPROPRIATE AUTHORIZATION.  <Electronically 
signed in Other Vendor System>                                                  
                                     SIGNED BY: Osmany Rubin MD 18 
1516
Initial ED EKG: STACH 100, NO ACUTE ST SEG CHANGES, NORMAL AXIS
(Mane SANTOS,Grady)
Plan of Care:
 Orders
 
 
Procedure Date/time Status
 
Admit to inpatient  1901 Active
 
URINE DRUG SCREEN FOR ER ONLY  1628 Active
 
URINALYSIS  1628 Complete
 
BIPAP  1447 Active
 
ARTERIAL BLOOD GAS (GEN)  1417 Complete
 
TROPONIN LEVEL  1417 Complete
 
LACTIC ACID  1417 Complete
 
COMPREHENSIVE METABOLIC PANEL  1417 Complete
 
CBC WITHOUT DIFFERENTIAL  1417 Complete
 
B-TYPE NATRIURETIC PEP (BNP)  1417 Complete
 
EKG  1400 Active
 
 
 Laboratory Tests
 
 
 
18 1851:
Urine Opiates Screen Pending, Methadone Screen Pending, Barbiturate Screen 
Pending, Ur Phencyclidine Scrn Pending, Amphetamines Screen Pending, U 
Benzodiazepines Scrn Pending, Urine Cocaine Screen Pending, Urine Cannabis 
Screen Pending, Urine Color YEL, Urine Clarity CLEAR, Urine pH 7.0, Ur Specific 
Gravity 1.015, Urine Protein NEG, Urine Ketones NEG, Urine Nitrite NEG, Urine 
Bilirubin NEG, Urine Urobilinogen 0.2, Ur Leukocyte Esterase NEG, Ur Microscopic
EXAM NOT REQUIRED, Urine Hemoglobin NEG, Urine Glucose NEG
 
18 1717:
Lactic Acid Cancelled
 
18 1437:
Anion Gap 9, Estimated GFR > 60, BUN/Creatinine Ratio 22.5, Glucose 101  H, 
Lactic Acid 1.4, Calcium 9.3, Total Bilirubin 0.5, AST 73  H, ALT 75  H, 
Alkaline Phosphatase 50, Troponin I < 0.01, Pro-B-Natriuretic Pept 23.8, Total 
Protein 6.5, Albumin 3.9, Globulin 2.6, Albumin/Globulin Ratio 1.5, CBC w Diff 
MAN DIFF ORDERED, RBC 5.30, MCV 89.4, MCH 28.5, MCHC 31.9  L, RDW 15.6  H, MPV 
8.9, Gran % 90.9  H, Lymphocytes % 4.9  L, Monocytes % 4.0, Eosinophils % 0.1, 
Basophils % 0.1, Absolute Granulocytes 11.6  H, Absolute Lymphocytes 0.6  L, 
Absolute Monocytes 0.5, Absolute Eosinophils 0, Absolute Basophils 0, Platelet 
Estimate ADEQUATE, Normocytic RBCs VERIFIED, Normochromic RBCs VERIFIED
 
18 1430:
pH 7.32  L, pCO2 74 *H, pO2 78  L, HCO3 38  H, ABG O2 Sat (Measured) 95.0  L, 
Carboxyhemoglobin 1.0  L, O2 Concentration % 2L, O2 Delivery Method NC, 
Phlebotomy Draw Site RIGHT RADIAL
She is alert awake and oriented 3 on my evaluation.  Labs ordered old records 
reviewed case discussed with Dr. Gloria agrees with plan ABG ordered
 
Patient placed on BiPAP discussed with the family plan of care
 
 
0 discussed with the family all his lab results x-ray findings need for 
admission which they're in agreement with 
 
Case discussed with Dr. Dozier who advised to bring the patient in under his 
service he will come and he will be here at approximately 1730 to evaluate the 
patient at which time it'll be decided whether the patient requires ICU versus 
GEN med
 
1840 Dr. Dozier in Department to evaluate patient advised that he believes the 
patient is suffering from ALS and should be placed in the ICU as a full 
admission.  Case discussed with Dr. Gloria
(Grady Alcantara)
(Gloria Kyle RODRÍGUEZ)
 
Departure
 
Departure
Time of Disposition: 
Disposition: STILL A PATIENT
Condition: Stable
Clinical Impression
Primary Impression: Hypercapnic respiratory failure
Referrals:
Seble Hull MD (PCP/Family)
 
Departure Forms:
Customer Survey
General Discharge Information
 
Admission Note
Spoke With:
Jacoby RODRÍGUEZ,Alex WONG
Documentation of Exam:
Documentation of any treatments & extenuating circumstances including Concerns 
Regarding Discharge (functional status, medication knowledge or non-compliance, 
living conditions, etc.) that warrant an admission rather than observation:
respiratory treatments BiPAP trend labs pulmonology consult, ALS WORKUP
Respiratory treatments when necessary premature discharge would BE medically 
harmful
 
(Grady Alcantara)
 
PA/NP Co-Sign Statement
Statement:
ED Attending supervision documentation-
 
[X] I saw and evaluated the patient. I have also reviewed all the pertinent lab 
results and diagnostic results. I agree with the findings and the plan of care 
as documented in the PA's/NP's documentation.  Patient presents for worsening 
lethargy and shortness of breath.  Physical examination on BiPAP reveals good 
bilateral air entry and no wheezes rales or rhonchi.
 
[] I have reviewed the ED Record and agree with the PA's/NP's documentation.
 
[] Additions or exceptions (if any) to the PAs/NP's note and plan are 
summarized below:
[]
 
(Juani RODRÍGUEZ,Kyle HARRISON)
 
Critical Care Note
 
Critical Care Note
Critical Care Time: 30-74 min
(Grady Alcantara)

## 2018-04-25 VITALS — SYSTOLIC BLOOD PRESSURE: 108 MMHG | DIASTOLIC BLOOD PRESSURE: 66 MMHG

## 2018-04-25 VITALS — DIASTOLIC BLOOD PRESSURE: 70 MMHG | SYSTOLIC BLOOD PRESSURE: 128 MMHG

## 2018-04-25 VITALS — DIASTOLIC BLOOD PRESSURE: 70 MMHG | SYSTOLIC BLOOD PRESSURE: 120 MMHG

## 2018-04-25 VITALS — DIASTOLIC BLOOD PRESSURE: 70 MMHG | SYSTOLIC BLOOD PRESSURE: 130 MMHG

## 2018-04-25 LAB
ABSOLUTE GRANULOCYTE CT: 11.9 /CUMM (ref 1.4–6.5)
BASOPHILS # BLD: 0 /CUMM (ref 0–0.2)
BASOPHILS NFR BLD: 0.1 % (ref 0–2)
EOSINOPHIL # BLD: 0 /CUMM (ref 0–0.7)
EOSINOPHIL NFR BLD: 0 % (ref 0–5)
ERYTHROCYTE [DISTWIDTH] IN BLOOD BY AUTOMATED COUNT: 15 % (ref 11.5–14.5)
GRANULOCYTES NFR BLD: 88.8 % (ref 42.2–75.2)
HCT VFR BLD CALC: 42.2 % (ref 42–52)
LYMPHOCYTES # BLD: 0.8 /CUMM (ref 1.2–3.4)
MCH RBC QN AUTO: 29.1 PG (ref 27–31)
MCHC RBC AUTO-ENTMCNC: 33 G/DL (ref 33–37)
MCV RBC AUTO: 88.2 FL (ref 80–94)
MONOCYTES # BLD: 0.7 /CUMM (ref 0.1–0.6)
PLATELET # BLD: 259 /CUMM (ref 130–400)
PMV BLD AUTO: 9.4 FL (ref 7.4–10.4)
RED BLOOD CELL CT: 4.78 /CUMM (ref 4.7–6.1)
WBC # BLD AUTO: 13.4 /CUMM (ref 4.8–10.8)

## 2018-04-25 NOTE — PN- PULMONARY
Subjective
HPI/Critical Care Issues:
More awake now
Mild tachy noted to 112
Pt did have his inhalers this am
Mild dyspnea sig fatigue
Has fasiculations
ON bipap
While off bipap he has sig dyspnea and resp insuff
 
ABG reviewed, Baseline pco2 now 72
ON oxygen
 
 
Objective
Current Medications:
 Current Medications
 
 
  Sig/Ariadna Start time  Last
 
Medication Dose Route Stop Time Status Admin
 
Acetaminophen 650 MG Q6P PRN 04/24 2000 AC 
 
  PO   
 
Albuterol Sulfate 3 ML Q4-PRN PRN 04/24 2230 AC 
 
  INH   
 
Albuterol Sulfate 2 PUF Q4-6 PRN PRN 04/24 2000 AC 
 
  INH   
 
Albuterol Sulfate 3 ML ONCE ONE 04/24 1430 DC 04/24
 
  INH 04/24 1431  1446
 
Atorvastatin Calcium 10 MG 1700 04/25 1700 AC 
 
  PO   
 
Budesonide/ 2 PUF BID 04/24 2100 AC 04/25
 
Formoterol Fumarate  INH   1000
 
Cefazolin Sodium 500 MG IQ8 04/25 0000 AC 04/25
 
  IV   0800
 
Cephalexin 500 MG Q6 04/24 2359 CAN 
 
  PO 04/25 2300  
 
Cholecalciferol 1,000 IU DAILY 04/25 0900 AC 04/25
 
  PO   0959
 
Cyanocobalamin 1,000 MCG DAILY 04/25 0900 AC 04/25
 
  PO   0959
 
Enoxaparin Sodium 40 MG DAILY 04/25 0900 AC 04/25
 
  SC   0959
 
Ipratropium Bromide 2.5 ML ONCE ONE 04/24 1430 DC 04/24
 
  INH 04/24 1431  1446
 
Methylprednisolone 0 .STK-MED ONE 04/24 1533 DC 
 
  .ROUTE   
 
Methylprednisolone 125 MG ONCE ONE 04/24 1430 DC 04/24
 
  IV 04/24 1431  1537
 
Phosphate 250 MG PC AND AT BEDTIME 04/25 0900 AC 04/25
 
  PO 04/25 2101  1000
 
Prednisone 10 MG DAILY 04/25 0900 AC 04/25
 
  PO 04/27 0901  0959
 
Prednisone 10 MG DAILY 04/24 2045 DC 
 
  PO 04/26 0901  
 
 
 
 
Vital Signs & I&O
Last 24 Hrs of Vitals and I&O:
 Vital Signs
 
 
Date Time Temp Pulse Resp B/P B/P Pulse O2 O2 Flow FiO2
 
     Mean Ox Delivery Rate 
 
04/25 0615  92    96   
 
04/25 0400      96 BIPAP 30% 
 
04/25 0307  89    96   
 
04/25 0035  93    96   
 
04/25 0000      95 BIPAP 30% 
 
04/25 0000 97.6 90 19 120/70  96 BIPAP 30% 
 
04/24 2220       BIPAP 30% 
 
04/24 2200 98.4 111 18 100/60  91 BIPAP 30% 
 
04/24 2200      95 BIPAP 30% 
 
04/24 2149      91   
 
04/24 2055  74 18 123/66  95 Nasal 3.0L 
 
       Cannula  
 
04/24 1738 97.0 73 18 139/81  95 BIPAP 30% 
 
04/24 1636  87 18 162/78  97 BIPAP  
 
04/24 1602      96   
 
04/24 1447  78    99   
 
04/24 1446      94 Nasal 2.0L 
 
       Cannula  
 
04/24 1405 96.0 96 20 182/88  91 Nasal 2.0L 
 
       Cannula  
 
 
 Intake & Output
 
 
 04/25 1600 04/25 0800 04/25 0000
 
Intake Total  240 
 
Output Total  375 175
 
Balance  -135 -175
 
    
 
Intake, Oral  240 
 
Output, Urine  375 175
 
Patient   155 lb
 
Weight   
 
Weight   Bed scale
 
Measurement   
 
Method   
 
 
 Laboratory Tests
 
 
 04/25 04/24
 
 0405 2135
 
Blood Gas  
 
  pH (7.35 - 7.45 PH)  7.36
 
  pCO2 (35 - 45 TORR)  71 *H
 
  pO2 (80 - 100 TORR)  65  L
 
  HCO3 (21 - 28 MEQ/L)  39  H
 
  ABG O2 Sat (Measured) (>96.0 %)  91.0  L
 
  P-50 (Temp Corrected)  Y
 
  Carboxyhemoglobin (1.5 - 5.0 %)  0.7  L
 
  O2 Concentration %  4L
 
  Temperature (97.0 - 100.0 FARH)  98.4
 
  O2 Delivery Method  N/C
 
Chemistry  
 
  Sodium (137 - 145 mmol/L) 146  H 
 
  Potassium (3.5 - 5.1 mmol/L) 4.7 
 
  Chloride (98 - 107 mmol/L) 100 
 
  Carbon Dioxide (22 - 30 mmol/L) 37  H 
 
  Anion Gap (5 - 16) 9 
 
  BUN (9 - 20 mg/dL) 14 
 
  Creatinine (0.7 - 1.2 mg/dL) 0.6  L 
 
  Estimated GFR (>60 ml/min) > 60 
 
  Glucose (65 - 99 mg/dL) 116  H 
 
  Calcium (8.4 - 10.2 mg/dL) 9.6 
 
  Phosphorus (2.5 - 4.5 mg/dL) 3.6 
 
  Magnesium (1.6 - 2.3 mg/dL) 1.9 
 
  Total Bilirubin (0.2 - 1.3 mg/dL) 0.6 
 
  AST (17 - 59 U/L) 46 
 
  ALT (21 - 72 U/L) 57 
 
  Albumin (3.5 - 5.0 g/dL) 3.1  L 
 
Hematology  
 
  CBC w Diff MAN DIFF ORDERED 
 
  WBC (4.8 - 10.8 /CUMM) 13.4  H 
 
  RBC (4.70 - 6.10 /CUMM) 4.78 
 
  Hgb (14.0 - 18.0 G/DL) 13.9  L 
 
  Hct (42 - 52 %) 42.2 
 
  MCV (80.0 - 94.0 FL) 88.2 
 
  MCH (27.0 - 31.0 PG) 29.1 
 
  MCHC (33.0 - 37.0 G/DL) 33.0 
 
  RDW (11.5 - 14.5 %) 15.0  H 
 
  Plt Count (130 - 400 /CUMM) 259 
 
  MPV (7.4 - 10.4 FL) 9.4 
 
  Gran % (42.2 - 75.2 %) 88.8  H 
 
  Lymphocytes % (20.5 - 51.1 %) 6.2  L 
 
  Monocytes % (1.7 - 9.3 %) 4.9 
 
  Eosinophils % (0 - 5 %) 0 
 
  Basophils % (0.0 - 2.0 %) 0.1 
 
  Absolute Granulocytes (1.4 - 6.5 /CUMM) 11.9  H 
 
  Segmented Neutrophils (42.2 - 75.2 %) 87  H 
 
  Absolute Lymphocytes (1.2 - 3.4 /CUMM) 0.8  L 
 
  Lymphocytes (20.5 - 51.1 %) 6  L 
 
  Monocytes (1.7 - 9.3 %) 7 
 
  Absolute Monocytes (0.10 - 0.60 /CUMM) 0.7  H 
 
  Absolute Eosinophils (0.0 - 0.7 /CUMM) 0 
 
  Absolute Basophils (0.0 - 0.2 /CUMM) 0 
 
  Platelet Estimate (ADEQUATE) ADEQUATE 
 
  Normochromic RBCs VERIFIED 
 
  Poikilocytosis 2+ 
 
  Ovalocytes 1+ 
 
  Stomatocytes 1+ 
 
Miscellaneous  
 
  Phlebotomy Draw Site  RIGHT RADIAL
 
Other Body Source  
 
  Fld Total RBCs Counted (%) 100 
 
 
 
 
 04/24 04/24
 
 1461 0757
 
Chemistry  
 
  Lactic Acid  Cancelled
 
Toxicology  
 
  Urine Opiates Screen (>2000 NG/ML) < 100 
 
  Methadone Screen (>300 NG/ML) < 40 
 
  Barbiturate Screen (>200 NG/ML) < 60 
 
  Ur Phencyclidine Scrn (>25 NG/ML) < 6.00 
 
  Amphetamines Screen (>1000 NG/ML) < 100 
 
  U Benzodiazepines Scrn (>200 NG/ML) < 85 
 
  Urine Cocaine Screen (>300 NG/ML) < 50 
 
  Urine Cannabis Screen (>50 NG/ML) < 5.00 
 
Urines  
 
  Urine Color (YEL,AMB,STR) YEL 
 
  Urine Clarity (CLEAR) CLEAR 
 
  Urine pH (5.0 - 8.0) 7.0 
 
  Ur Specific Gravity (1.001 - 1.035) 1.015 
 
  Urine Protein (NEG,<30 MG/DL) NEG 
 
  Urine Ketones (NEG) NEG 
 
  Urine Nitrite (NEG) NEG 
 
  Urine Bilirubin (NEG) NEG 
 
  Urine Urobilinogen (0.1  -  1.0 EU/dl) 0.2 
 
  Ur Leukocyte Esterase (NEG) NEG 
 
  Ur Microscopic EXAM NOT REQUIRED 
 
  Urine Hemoglobin (NEG) NEG 
 
  Urine Glucose (N MG/DL) NEG 
 
 
 
 
 04/24 04/24
 
 1437 1430
 
Blood Gas  
 
  pH (7.35 - 7.45 PH)  7.32  L
 
  pCO2 (35 - 45 TORR)  74 *H
 
  pO2 (80 - 100 TORR)  78  L
 
  HCO3 (21 - 28 MEQ/L)  38  H
 
  ABG O2 Sat (Measured) (>96.0 %)  95.0  L
 
  Carboxyhemoglobin (1.5 - 5.0 %)  1.0  L
 
  O2 Concentration %  2L
 
  O2 Delivery Method  NC
 
Chemistry  
 
  Sodium (137 - 145 mmol/L) 147  H 
 
  Potassium (3.5 - 5.1 mmol/L) 4.4 
 
  Chloride (98 - 107 mmol/L) 97  L 
 
  Carbon Dioxide (22 - 30 mmol/L) 41  H 
 
  Anion Gap (5 - 16) 9 
 
  BUN (9 - 20 mg/dL) 9 
 
  Creatinine (0.7 - 1.2 mg/dL) 0.4  L 
 
  Estimated GFR (>60 ml/min) > 60 
 
  BUN/Creatinine Ratio (7 - 25 %) 22.5 
 
  Glucose (65 - 99 mg/dL) 101  H 
 
  Lactic Acid (0.7 - 2.1 mmol/L) 1.4 
 
  Calcium (8.4 - 10.2 mg/dL) 9.3 
 
  Phosphorus (2.5 - 4.5 mg/dL) 4.5 
 
  Magnesium (1.6 - 2.3 mg/dL) 1.9 
 
  Total Bilirubin (0.2 - 1.3 mg/dL) 0.5 
 
  AST (17 - 59 U/L) 73  H 
 
  ALT (21 - 72 U/L) 75  H 
 
  Alkaline Phosphatase (< 127 U/L) 50 
 
  Troponin I (<0.11 ng/ml) < 0.01 
 
  Pro-B-Natriuretic Pept (<125 pg/mL) 23.8 
 
  Total Protein (6.3 - 8.2 g/dL) 6.5 
 
  Albumin (3.5 - 5.0 g/dL) 3.9 
 
  Globulin (1.9 - 4.2 gm/dL) 2.6 
 
  Albumin/Globulin Ratio (1.1 - 2.2 %) 1.5 
 
Hematology  
 
  CBC w Diff MAN DIFF ORDERED 
 
  WBC (4.8 - 10.8 /CUMM) 12.8  H 
 
  RBC (4.70 - 6.10 /CUMM) 5.30 
 
  Hgb (14.0 - 18.0 G/DL) 15.1 
 
  Hct (42 - 52 %) 47.4 
 
  MCV (80.0 - 94.0 FL) 89.4 
 
  MCH (27.0 - 31.0 PG) 28.5 
 
  MCHC (33.0 - 37.0 G/DL) 31.9  L 
 
  RDW (11.5 - 14.5 %) 15.6  H 
 
  Plt Count (130 - 400 /CUMM) 288 
 
  MPV (7.4 - 10.4 FL) 8.9 
 
  Gran % (42.2 - 75.2 %) 90.9  H 
 
  Lymphocytes % (20.5 - 51.1 %) 4.9  L 
 
  Monocytes % (1.7 - 9.3 %) 4.0 
 
  Eosinophils % (0 - 5 %) 0.1 
 
  Basophils % (0.0 - 2.0 %) 0.1 
 
  Absolute Granulocytes (1.4 - 6.5 /CUMM) 11.6  H 
 
  Absolute Lymphocytes (1.2 - 3.4 /CUMM) 0.6  L 
 
  Absolute Monocytes (0.10 - 0.60 /CUMM) 0.5 
 
  Absolute Eosinophils (0.0 - 0.7 /CUMM) 0 
 
  Absolute Basophils (0.0 - 0.2 /CUMM) 0 
 
  Platelet Estimate (ADEQUATE) ADEQUATE 
 
  Normocytic RBCs VERIFIED 
 
  Normochromic RBCs VERIFIED 
 
Miscellaneous  
 
  Phlebotomy Draw Site  RIGHT RADIAL
 
 
 Microbiology
 
 
Date/Time Procedure - Status
 
Source Growth
 
04/24 2130 Surveillance Culture - RECD
 
UPPER RESP 
 
04/24 2130 Surveillance Culture - RECD
 
GI 
 
 
 
 
Impression/Plan
 
Impression/Plan
Impression/Plan:
General Appearance on bipap and in resp failure 
Skin No Rashes
Skin Temp/Moisture Exam: Warm/Dry
HEENT Atraumatic, PERRLA, EOMI, Mucous Membr. moist/pink
Neck Supple
Lymphatic No cervical lymphadenopathy 
Cardiovascular Regular Rate, Normal S1, Normal S2, No Murmurs
Lungs Clear to Auscultation, Decrease air entery of left lung 
Abdomen Normal Bowel Sounds, Soft, No Tenderness
Neurological Normal Speech, Strength at 5/5 X4 Ext, Normal Tone, Sensation 
Intact, Cranial Nerves 3-12 NL, Hyperreflexia x4 clonus fasculation 
Extremities No Clubbing, No Cyanosis, No Edema, Normal Pulses
multiple fasciculation
 
IMPRESSION
 
PT with recent extensive cervical spine surg for severe djd, previous left sided
diapharam paralysis with history of sig smoking, COPD with mixed obstructive 
restrictive lung disease, Fev1 not too low to have hypercarbia with recent 
autonomic dysfunction now with 
 
* Progressive hypercarbic resp failure -now clearly appears to be due to prob 
neuromuscular degenerative disease (fev1 is 1.45 and ratio was more than 70 in 
my office, and hence not contributing to his hypercarbic resp failure.
 
* Prob motor neuron disease. Unilateral left sided diaphram paralysis and has 
copd aswell- however his copd is not severe enough to explain worsening 
hypercarbia(recent ct and pet scan showed bilateral lower lobe pulm contrast 
enhancing infiltrates highly sugg of dynamic atelectasis from his left sided 
diaphramatic paralysis and hypoventilation and neurodegen disease 
 
* Cervical disc disease /  s/p surg as he had quadreperesis before with good 
recovery of function
 
* Atelectasis bibasilar due to cervical dz causeing diapharm dysfunction and 
left diapharamatic paralysis, and now prob neurodegenarative disease
 
* COPD fev1 of 1.49 litres, with no copd exacerbatin
 
* REcent Cellulitis foot on antibiotics improved
 
* History of smoking 30 or more pack years quit 2016
 
* Personal history of malignant neoplasm of prostate s/p surg needs follw up in 
the future, no evidence of recurrence
 
* Mixed restrictive and obstructive lung disease 
 
* recent colon polyp removal
 
REC
Cont bipap at night
CT neck and head to rule out any sig path of the cspine and head 
Prednisone 10 mg daily for two and 5 mg daily for two and dc
Cont spiriva and symbicort
Pt should avoid all sedative and hypnotics 
Pt is critically ill tts 46
Discussed with wife yesterday

## 2018-04-25 NOTE — RADIOLOGY REPORT
EXAMINATION:
XR FLUOROSCOPY
 
CLINICAL INFORMATION:
72-year-old male with suspected right-sided diaphragmatic palsy. Patient is
known to have left-sided diaphragmatic palsy.
 
COMPARISON:
Chest radiograph done on 04/24/2018, fluoroscopy done on 10/17/2017 and chest
radiograph done on 09/25/2017.
 
TECHNIQUE:
Real-time fluoroscopic imaging of the diaphragm (sniff test).
 
FINDINGS:
Low lung volume is present bilaterally with persistent stable asymmetric
elevated left hemidiaphragm as compared to the right side.
 
The right diaphragm shows normal physiologic motion and appear unchanged
since 9/25/2017 and 10/17/2017.
 
There is relative mild decreased movement of the left hemidiaphragm noted as
compared to the right side.
 
FLUOROSCOPY TIME:
1 minute and 27 seconds.
 
NUMBER OF IMAGES:
4 images
 
IMPRESSION:
No fluoroscopic evidence of right-sided diaphragmatic palsy.

## 2018-04-25 NOTE — PN- RESIDENT CRCU
Subjective
HPI/CRCU Issues:
Acute on chronic hypercarbic respiratory failure
History of COPD
History of left-sided diaphragmatic paralysis
Leukocytosis likely steroid-induced
History of cervical spondylosis status post surgery
Left foot cellulitis
Constipation
History of hyperlipidemia
 
24 Hour Events:
Patient states he slept very well last night on BiPAP, but does not notice any 
improvement in his breathing.  He continues to be on 4 L oxygen per nasal 
cannula(uses 2 L at night at home).  Denies any fevers/chills, cough, sputum 
production, sick contacts at home or recent travel.
 
 
Objective
 
Vital Signs & I&O
Last 8 Hrs of Vitals and I&O:
 Intake & Output
 
 
  1600
 
Intake Total 1080
 
Output Total 200
 
Balance 880
 
  
 
Intake, IV 20
 
Intake, Oral 1060
 
Number 2
 
Bowel 
 
Movements 
 
Output, Urine 200
 
 
 
 
Exam
General Appearance: no apparent distress, alert, awake, comfortable
Head: atraumatic, normal appearance
Respiratory: chest non-tender, decreased breath sounds
Cardiovascular: regular rate/rhythm
Gastrointestinal: normal bowel sounds, soft, non-tender
Extremities: no edema, Left foot erythematous
Cranial Nerves: normal hearing, normal speech, PERRL
Current Medications:
 Current Medications
 
 
  Sig/Ariadna Start time  Last
 
Medication Dose Route Stop Time Status Admin
 
Acetaminophen 650 MG Q6P PRN 2000 AC 
 
  PO   
 
Albuterol Sulfate 3 ML Q4-PRN PRN  2230 AC 
 
  INH   1115
 
Albuterol Sulfate 2 PUF Q4-6 PRN PRN  2000 AC 
 
  INH   
 
Albuterol Sulfate 3 ML ONCE ONE  1430 DC 
 
  INH  1431  1446
 
Atorvastatin Calcium 10 MG 1700  1700 AC 
 
  PO   
 
Budesonide/ 2 PUF BID  2100 AC 
 
Formoterol Fumarate  INH   1000
 
Cefazolin Sodium 500 MG IQ8  0000 AC 
 
  IV   0800
 
Cephalexin 500 MG Q6  2359 CAN 
 
  PO  2300  
 
Cholecalciferol 1,000 IU DAILY  09 AC 
 
  PO   0959
 
Cyanocobalamin 1,000 MCG DAILY  0900 AC 
 
  PO   0959
 
Enoxaparin Sodium 40 MG DAILY  0900 AC 
 
  SC   0959
 
Ipratropium Bromide 2.5 ML ONCE ONE  1430 DC 
 
  INH  1431  1446
 
Methylprednisolone 0 .STK-MED ONE  1533 DC 
 
  .ROUTE   
 
Methylprednisolone 125 MG ONCE ONE  1430 DC 
 
  IV  1431  1537
 
Phosphate 250 MG PC AND AT BEDTIME  09 AC 
 
  PO  210  1215
 
Prednisone 10 MG DAILY  09 AC 
 
  PO  0901  0959
 
Prednisone 10 MG DAILY  204 DC 
 
  PO  0901  
 
 
 
 
Impression/Plan
 
Impression/Problem List
Impression:
Mr. Santiago is 72-year-old gentleman with past medical history significant for 
COPD on 2 L nocturnal oxygen, chronic constipation, hyperlipidemia, chronic 
respiratory failure, left-sided diaphramatic paralysis, total right knee 
replacement , severe degenerative joint disease status post extensive 
cervical spine surgery 2017, former smoker, COPD with mixed obstructive and 
restrictive lung disease presented to the emergency room for evaluation of 
worsening short of breath, lethargy, confusion for few hours.
 
Patient was recently discharged from [facility] on 2018 after being 
treated for acute hypercarbic respiratory failure.  He was also started on abx 
for left foot cellulitis(day 5 of abx on admission). 
 
Patient was admitted to the ICU for management of following issues;
Acute on chronic hypercarbic respiratory failure
History of COPD
History of left-sided diaphragmatic paralysis
Leukocytosis likely steroid-induced
History of cervical spondylosis status post surgery
Left foot cellulitis
Constipation
History of hyperlipidemia
 
 
Respiratory;
1. Acute on chronic hypercarbic respiratory failure; likely multifactorial from 
left-sided diaphragmatic paralysis and COPD, however not at end-stage COPD to 
explain worsening hypercarbia.  Recent PET scan showed bilateral lower lobe 
pulmonary infiltrates suggestive of dynamic atelectasis from left-sided 
diaphragmatic patency. He has also been extensively worked up as an outpatient 
by his neurologist for motoneuron disease/ALS that can lead to respiratory 
failure.
Patient presented with worsening shortness of breath, lethargy, confusion.  ABGs
in the emergency room showed pH 7.32, carbon dioxide 74, bicarbonate 38.  Given 
his acute hypercarbic respiratory failure he was placed on BiPAP with 
improvement of his symptoms.  Repeat ABGs showed a pH of 7.36 and a PCO2 of 71. 
He was transitioned to nasal cannula, continues to be on 4 L of oxygen. Chest x-
ray showed Low lung volumes and subsegmental atelectasis or scar involving the 
left lower lobe. No overt airspace disease. 
 
* Monitor vitals every shift
* BiPAP PRN
* Continue oxygen supplementation to maintain saturation above 92
* TRC nebs
* Will complete his prednisone taper for 3 more days- 10 mg daily
* Check vital capacity
* MRI head and cervical spine to look for any CNS pathology leading to 
hypercarbic respiratory failure;
* Sniff test diaphragmatic fluoroscopy to look for right-sided diaphragmatic 
palsy; results pending. Given his chronic respiratory failure with acute 
exacerbations leading to multiple admissions will get sniff test to look for 
bilateral diaphragmatic paralysis. 
 
Infectious;
1. Left foot cellulitis
Patient was started on Keflex 500 every 6hrs for left foot cellulitis during his
last admission- 7 day course.  Will complete his antibiotic course by 2018. Day 6 of Abx.
 
Cardiology;
1. Hyperlipidemia
- Continue Lipitor 10mg daily
 
Heme;
Leukocytosis
WBC 12.8 most likely from recent steroid use.
- continue to monitor. 
 
Metabolic;
- Continue to monitor electrolytes and replete accordingly.
 
Alimentary;
1. Constipation
- Continue senna as required for constipation
- Regular diet
 
Neuro;
1. History of cervical spondylosis status post surgery
He had MRI cervical spine and head in 2017 for evaluation of flaccid 
atrophy of left upper extremity and quadriparesis.  He has advanced cervical 
spondylosis at C5-C6,, compression of cervical spinal cord multiple degenerative
changes, severe central canal stenosis-status post extensive cervical spine 
surgery in 2017. 
 
DVT Prophylaxis; Alps and subcutaneous Lovenox
Patient is full code
Problem List:
 1. Hypercapnic respiratory failure
 
 2. Cellulitis
 
Pain Ratin
Pain Location:
NA
Pain Goal: Remain pain free
Pain Plan:
NA
Tomorrow's Labs & Rationales:
CBC(leukocytosis)
ICU Bundle
 
Plan
DVT/Prophylaxis: mechanical, pharmacological

## 2018-04-25 NOTE — EVENT NOTE
Event Note
Event Note:
PT has sig hypercarbia with dx of 
 
Dx neuromuscular disease
ICD G 70.9
 
* Has Sig dyspnea and resp failure, with sig symptoms of fatigue, severe dyspnea
, morning headache and severe hypoxia and hypersomnia during the day time 
needing multiple admissions to the hospital
 
* Subsequently on bipap with back up rate has had remarkable improvement of his 
symptoms and overall condition
 
 
 
Pt clearly needs Bipap with a back up rate RESP assist device  needs AVAPS 
(rate and tidal volume back up)
 
* ABG shows baseline pco2 of 71 at baseline while awake on baseline oxygen of 2 
litres
* Nocturnal oxymety shows severe desat on oxygen with less than 88 percent for a
total of one hour (60 mins)
* FOrced vital capacity is low at 1.4 litres which is less than 50 percent 
predicted for the patient (predicted is 3.4 litres)
 
PT is on Bipap of 16/6 with a back up rate of 12 and hence he needs Respiratory 
assist device avps with the same settings

## 2018-04-26 VITALS — DIASTOLIC BLOOD PRESSURE: 68 MMHG | SYSTOLIC BLOOD PRESSURE: 118 MMHG

## 2018-04-26 VITALS — SYSTOLIC BLOOD PRESSURE: 110 MMHG | DIASTOLIC BLOOD PRESSURE: 60 MMHG

## 2018-04-26 VITALS — SYSTOLIC BLOOD PRESSURE: 100 MMHG | DIASTOLIC BLOOD PRESSURE: 60 MMHG

## 2018-04-26 VITALS — DIASTOLIC BLOOD PRESSURE: 72 MMHG | SYSTOLIC BLOOD PRESSURE: 130 MMHG

## 2018-04-26 VITALS — SYSTOLIC BLOOD PRESSURE: 100 MMHG | DIASTOLIC BLOOD PRESSURE: 70 MMHG

## 2018-04-26 LAB
ABSOLUTE GRANULOCYTE CT: 7 /CUMM (ref 1.4–6.5)
BASOPHILS # BLD: 0 /CUMM (ref 0–0.2)
BASOPHILS NFR BLD: 0.4 % (ref 0–2)
EOSINOPHIL # BLD: 0.2 /CUMM (ref 0–0.7)
EOSINOPHIL NFR BLD: 1.6 % (ref 0–5)
ERYTHROCYTE [DISTWIDTH] IN BLOOD BY AUTOMATED COUNT: 15.5 % (ref 11.5–14.5)
GRANULOCYTES NFR BLD: 66.6 % (ref 42.2–75.2)
HCT VFR BLD CALC: 40.4 % (ref 42–52)
LYMPHOCYTES # BLD: 2.4 /CUMM (ref 1.2–3.4)
MCH RBC QN AUTO: 28.8 PG (ref 27–31)
MCHC RBC AUTO-ENTMCNC: 32.7 G/DL (ref 33–37)
MCV RBC AUTO: 88 FL (ref 80–94)
MONOCYTES # BLD: 0.8 /CUMM (ref 0.1–0.6)
PLATELET # BLD: 247 /CUMM (ref 130–400)
PMV BLD AUTO: 8.6 FL (ref 7.4–10.4)
RED BLOOD CELL CT: 4.59 /CUMM (ref 4.7–6.1)
WBC # BLD AUTO: 10.5 /CUMM (ref 4.8–10.8)

## 2018-04-26 NOTE — CT SCAN REPORT
CT HEAD WITHOUT IV CONTRAST
CT CERVICAL SPINE WITHOUT IV CONTRAST
 
INDICATION: Hypercarbic respiratory distress to rule out CNS pathology.
 
COMPARISON: Head CT 04/16/2018 and cervical spine CT 01/06/2018.
 
TECHNIQUE: Multidetector CT acquisitions of the head and cervical spine were
obtained without IV contrast. Multiplanar reformats were acquired and
utilized for image interpretation.
 
FINDINGS:
 
HEAD:
Very limited motion degraded noncontrast CT of the head with streak artifact
at the periphery of the brain parenchyma limiting assessment for small volume
intracranial hemorrhage. Original large volume intracranial hemorrhage. There
is global cerebral volume loss. No definite acute territorial infarcts are
identified with assessment limited by the degree of artifact. The basilar
cisterns are preserved. Mild mucosal thickening within the right sphenoid
sinus. The remaining paranasal sinuses and the mastoid air cells are clear.
 
CERVICAL SPINE:
Stable cervical alignment with mild retrosubluxation of C3 on C4. Moderate to
severe disc volume loss and degenerative endplate changes at the C3-C7 levels
remain stable. There is moderate to severe bilateral bony foraminal stenosis
at all of these levels. There is multilevel hypertrophic facet arthropathy.
There are laminectomy changes at C6. Hypertrophic degenerative changes of the
atlantodental interval. There is no acute fracture and there is no acute
subluxation. There is no prevertebral soft tissue swelling. No significant
soft tissue abnormality within the neck. Centrilobular emphysema within the
imaged upper lungs.
 
IMPRESSION:
1. No definite acute intracranial findings though assessment is very limited
by the degree of motion/streak artifact which significantly obscures portions
of the intracranial compartment.
 
2. No acute osseous abnormality within the cervical spine. Stable appearing
moderate to severe cervical spondylosis. Chronic C6 laminectomy.

## 2018-04-26 NOTE — PN- CRCU
Subjective
HPI/Critical Care Issues:
Much improved
Off bipap
mild pedal edema
 
Objective
Current Medications:
 Current Medications
 
 
  Sig/Ariadna Start time  Last
 
Medication Dose Route Stop Time Status Admin
 
Acetaminophen 650 MG Q6P PRN 04/24 2000 AC 
 
  PO   
 
Albuterol Sulfate 3 ML Q4-PRN PRN 04/24 2230 AC 04/25
 
  INH   1115
 
Albuterol Sulfate 2 PUF Q4-6 PRN PRN 04/24 2000 AC 
 
  INH   
 
Atorvastatin Calcium 10 MG 1700 04/25 1700 AC 04/25
 
  PO   1750
 
Budesonide/ 2 PUF BID 04/24 2100 AC 04/26
 
Formoterol Fumarate  INH   0756
 
Cefazolin Sodium 500 MG IQ8 04/25 0000 AC 04/26
 
  IV   0755
 
Cholecalciferol 1,000 IU DAILY 04/25 0900 AC 04/26
 
  PO   0755
 
Cyanocobalamin 1,000 MCG DAILY 04/25 0900 AC 04/26
 
  PO   0755
 
Enoxaparin Sodium 40 MG DAILY 04/25 0900 AC 04/26
 
  SC   0755
 
Magnesium Oxide 400 MG BID 04/26 1108 AC 04/26
 
  PO 04/27 0901  1228
 
Phosphate 250 MG PC AND AT BEDTIME 04/25 0900 DC 04/25
 
  PO 04/25 2101  2028
 
Prednisone 5 MG DAILY 04/27 0900 AC 
 
  PO 04/28 0901  
 
Prednisone 10 MG DAILY 04/25 0900 DC 04/26
 
  PO 04/26 0901  0755
 
 
 
 
Vital Signs & I&O
Last 24 Hrs of Vitals and I&O:
 Vital Signs
 
 
Date Time Temp Pulse Resp B/P B/P Pulse O2 O2 Flow FiO2
 
     Mean Ox Delivery Rate 
 
04/26 1200      96 Nasal 4.0L 
 
       Cannula  
 
04/26 0800      97 Nasal 3.0L 
 
       Cannula  
 
04/26 0800 98.1 88 18 110/60  97 Nasal 3.0L 
 
       Cannula  
 
04/26 0609  85    96   
 
04/26 0400      96 BIPAP 30% 
 
04/26 0256  86    96   
 
04/26 0011  84    96   
 
04/26 0000 97.2 84 18 100/60  95 BIPAP 30% 
 
04/26 0000      95 BIPAP 30% 
 
04/25 2231  98    97   
 
04/25 2000      94 Nasal 4.0L 
 
       Cannula  
 
04/25 2000      94 Nasal 4.0L 
 
       Cannula  
 
04/25 1600      94 Nasal 4.0L 
 
       Cannula  
 
04/25 1600 99.0 115 26 108/66  96 Nasal 4.0L 
 
       Cannula  
 
 
 Intake & Output
 
 
 04/26 1600 04/26 0800 04/26 0000
 
Intake Total  200 1100
 
Output Total  350 450
 
Balance  -150 650
 
    
 
Intake, IV   20
 
Intake, Oral  200 1080
 
Number   1
 
Bowel   
 
Movements   
 
Output, Urine  350 450
 
 
 Laboratory Tests
 
 
 04/26 04/25
 
 0600 0405
 
Chemistry  
 
  Sodium (137 - 145 mmol/L) 141 146  H
 
  Potassium (3.5 - 5.1 mmol/L) 4.1 4.7
 
  Chloride (98 - 107 mmol/L) 96  L 100
 
  Carbon Dioxide (22 - 30 mmol/L) 41  H 37  H
 
  Anion Gap (5 - 16) 4  L 9
 
  BUN (9 - 20 mg/dL) 18 14
 
  Creatinine (0.7 - 1.2 mg/dL) 0.6  L 0.6  L
 
  Estimated GFR (>60 ml/min) > 60 > 60
 
  Glucose (65 - 99 mg/dL) 87 116  H
 
  Calcium (8.4 - 10.2 mg/dL) 9.0 9.6
 
  Phosphorus (2.5 - 4.5 mg/dL) 4.0 3.6
 
  Magnesium (1.6 - 2.3 mg/dL) 1.7 1.9
 
  Total Bilirubin (0.2 - 1.3 mg/dL) 0.8 0.6
 
  AST (17 - 59 U/L) 52 46
 
  ALT (21 - 72 U/L) 62 57
 
  Albumin (3.5 - 5.0 g/dL) 2.9  L 3.1  L
 
Hematology  
 
  CBC w Diff NO MAN DIFF REQ MAN DIFF ORDERED
 
  WBC (4.8 - 10.8 /CUMM) 10.5 13.4  H
 
  RBC (4.70 - 6.10 /CUMM) 4.59  L 4.78
 
  Hgb (14.0 - 18.0 G/DL) 13.2  L 13.9  L
 
  Hct (42 - 52 %) 40.4  L 42.2
 
  MCV (80.0 - 94.0 FL) 88.0 88.2
 
  MCH (27.0 - 31.0 PG) 28.8 29.1
 
  MCHC (33.0 - 37.0 G/DL) 32.7  L 33.0
 
  RDW (11.5 - 14.5 %) 15.5  H 15.0  H
 
  Plt Count (130 - 400 /CUMM) 247 259
 
  MPV (7.4 - 10.4 FL) 8.6 9.4
 
  Gran % (42.2 - 75.2 %) 66.6 88.8  H
 
  Lymphocytes % (20.5 - 51.1 %) 23.3 6.2  L
 
  Monocytes % (1.7 - 9.3 %) 8.1 4.9
 
  Eosinophils % (0 - 5 %) 1.6 0
 
  Basophils % (0.0 - 2.0 %) 0.4 0.1
 
  Absolute Granulocytes (1.4 - 6.5 /CUMM) 7.0  H 11.9  H
 
  Segmented Neutrophils (42.2 - 75.2 %)  87  H
 
  Absolute Lymphocytes (1.2 - 3.4 /CUMM) 2.4 0.8  L
 
  Lymphocytes (20.5 - 51.1 %)  6  L
 
  Monocytes (1.7 - 9.3 %)  7
 
  Absolute Monocytes (0.10 - 0.60 /CUMM) 0.8  H 0.7  H
 
  Absolute Eosinophils (0.0 - 0.7 /CUMM) 0.2 0
 
  Absolute Basophils (0.0 - 0.2 /CUMM) 0 0
 
  Platelet Estimate (ADEQUATE)  ADEQUATE
 
  Normochromic RBCs  VERIFIED
 
  Poikilocytosis  2+
 
  Ovalocytes  1+
 
  Stomatocytes  1+
 
Other Body Source  
 
  Fld Total RBCs Counted (%)  100
 
 
 
 
 04/24 04/24
 
 2734 7237
 
Blood Gas  
 
  pH (7.35 - 7.45 PH) 7.36 
 
  pCO2 (35 - 45 TORR) 71 *H 
 
  pO2 (80 - 100 TORR) 65  L 
 
  HCO3 (21 - 28 MEQ/L) 39  H 
 
  ABG O2 Sat (Measured) (>96.0 %) 91.0  L 
 
  P-50 (Temp Corrected) Y 
 
  Carboxyhemoglobin (1.5 - 5.0 %) 0.7  L 
 
  O2 Concentration % 4L 
 
  Temperature (97.0 - 100.0 FARH) 98.4 
 
  O2 Delivery Method N/C 
 
Miscellaneous  
 
  Phlebotomy Draw Site RIGHT RADIAL 
 
Toxicology  
 
  Urine Opiates Screen (>2000 NG/ML)  < 100
 
  Methadone Screen (>300 NG/ML)  < 40
 
  Barbiturate Screen (>200 NG/ML)  < 60
 
  Ur Phencyclidine Scrn (>25 NG/ML)  < 6.00
 
  Amphetamines Screen (>1000 NG/ML)  < 100
 
  U Benzodiazepines Scrn (>200 NG/ML)  < 85
 
  Urine Cocaine Screen (>300 NG/ML)  < 50
 
  Urine Cannabis Screen (>50 NG/ML)  < 5.00
 
Urines  
 
  Urine Color (YEL,AMB,STR)  YEL
 
  Urine Clarity (CLEAR)  CLEAR
 
  Urine pH (5.0 - 8.0)  7.0
 
  Ur Specific Gravity (1.001 - 1.035)  1.015
 
  Urine Protein (NEG,<30 MG/DL)  NEG
 
  Urine Ketones (NEG)  NEG
 
  Urine Nitrite (NEG)  NEG
 
  Urine Bilirubin (NEG)  NEG
 
  Urine Urobilinogen (0.1  -  1.0 EU/dl)  0.2
 
  Ur Leukocyte Esterase (NEG)  NEG
 
  Ur Microscopic  EXAM NOT REQUIRED
 
  Urine Hemoglobin (NEG)  NEG
 
  Urine Glucose (N MG/DL)  NEG
 
 
 
 
 04/24 04/24
 
 1717 1437
 
Chemistry  
 
  Sodium (137 - 145 mmol/L)  147  H
 
  Potassium (3.5 - 5.1 mmol/L)  4.4
 
  Chloride (98 - 107 mmol/L)  97  L
 
  Carbon Dioxide (22 - 30 mmol/L)  41  H
 
  Anion Gap (5 - 16)  9
 
  BUN (9 - 20 mg/dL)  9
 
  Creatinine (0.7 - 1.2 mg/dL)  0.4  L
 
  Estimated GFR (>60 ml/min)  > 60
 
  BUN/Creatinine Ratio (7 - 25 %)  22.5
 
  Glucose (65 - 99 mg/dL)  101  H
 
  Lactic Acid (0.7 - 2.1 mmol/L) Cancelled 1.4
 
  Calcium (8.4 - 10.2 mg/dL)  9.3
 
  Phosphorus (2.5 - 4.5 mg/dL)  4.5
 
  Magnesium (1.6 - 2.3 mg/dL)  1.9
 
  Total Bilirubin (0.2 - 1.3 mg/dL)  0.5
 
  AST (17 - 59 U/L)  73  H
 
  ALT (21 - 72 U/L)  75  H
 
  Alkaline Phosphatase (< 127 U/L)  50
 
  Troponin I (<0.11 ng/ml)  < 0.01
 
  Pro-B-Natriuretic Pept (<125 pg/mL)  23.8
 
  Total Protein (6.3 - 8.2 g/dL)  6.5
 
  Albumin (3.5 - 5.0 g/dL)  3.9
 
  Globulin (1.9 - 4.2 gm/dL)  2.6
 
  Albumin/Globulin Ratio (1.1 - 2.2 %)  1.5
 
Hematology  
 
  CBC w Diff  MAN DIFF ORDERED
 
  WBC (4.8 - 10.8 /CUMM)  12.8  H
 
  RBC (4.70 - 6.10 /CUMM)  5.30
 
  Hgb (14.0 - 18.0 G/DL)  15.1
 
  Hct (42 - 52 %)  47.4
 
  MCV (80.0 - 94.0 FL)  89.4
 
  MCH (27.0 - 31.0 PG)  28.5
 
  MCHC (33.0 - 37.0 G/DL)  31.9  L
 
  RDW (11.5 - 14.5 %)  15.6  H
 
  Plt Count (130 - 400 /CUMM)  288
 
  MPV (7.4 - 10.4 FL)  8.9
 
  Gran % (42.2 - 75.2 %)  90.9  H
 
  Lymphocytes % (20.5 - 51.1 %)  4.9  L
 
  Monocytes % (1.7 - 9.3 %)  4.0
 
  Eosinophils % (0 - 5 %)  0.1
 
  Basophils % (0.0 - 2.0 %)  0.1
 
  Absolute Granulocytes (1.4 - 6.5 /CUMM)  11.6  H
 
  Absolute Lymphocytes (1.2 - 3.4 /CUMM)  0.6  L
 
  Absolute Monocytes (0.10 - 0.60 /CUMM)  0.5
 
  Absolute Eosinophils (0.0 - 0.7 /CUMM)  0
 
  Absolute Basophils (0.0 - 0.2 /CUMM)  0
 
  Platelet Estimate (ADEQUATE)  ADEQUATE
 
  Normocytic RBCs  VERIFIED
 
  Normochromic RBCs  VERIFIED
 
 
 
 
 04/24
 
 1430
 
Blood Gas 
 
  pH (7.35 - 7.45 PH) 7.32  L
 
  pCO2 (35 - 45 TORR) 74 *H
 
  pO2 (80 - 100 TORR) 78  L
 
  HCO3 (21 - 28 MEQ/L) 38  H
 
  ABG O2 Sat (Measured) (>96.0 %) 95.0  L
 
  Carboxyhemoglobin (1.5 - 5.0 %) 1.0  L
 
  O2 Concentration % 2L
 
  O2 Delivery Method NC
 
Miscellaneous 
 
  Phlebotomy Draw Site RIGHT RADIAL
 
 
 Microbiology
 
 
Date/Time Procedure - Status
 
Source Growth
 
04/24 2130 Surveillance Culture - COMP
 
UPPER RESP 
 
04/24 2130 Surveillance Culture - COMP
 
GI 
 
 
 
 
Impression/Plan
 
Impression/Plan
Impression/Plan:
 ECHO
CONCLUSIONS 
 Normal size left ventricle. 
 Normal left ventricular wall thickness. 
 Normal left ventricular ejection fraction visually estimated at   > 
 60%. 
 Mild mitral regurgitation. 
 Trace aortic regurgitation. 
 
 Right ventricular systolic pressure estimated to be elevated at  42   
 mmHg. 
 Mild pulmonic regurgitation. 
 Final Date:      08 January 2018  
 
 
General Appearance on bipap and in resp failure 
Skin No Rashes
Skin Temp/Moisture Exam: Warm/Dry
HEENT Atraumatic, PERRLA, EOMI, Mucous Membr. moist/pink
Neck Supple
Lymphatic No cervical lymphadenopathy 
Cardiovascular Regular Rate, Normal S1, Normal S2, No Murmurs
Lungs Clear to Auscultation, Decrease air entery of left lung 
Abdomen Normal Bowel Sounds, Soft, No Tenderness
Neurological Normal Speech, Strength at 5/5 X4 Ext, Normal Tone, Sensation 
Intact, Cranial Nerves 3-12 NL, Hyperreflexia x4 clonus fasculation 
Extremities No Clubbing, No Cyanosis, No Edema, Normal Pulses
multiple fasciculation
 
IMPRESSION
 
PT with recent extensive cervical spine surg for severe djd, previous left sided
diapharam paralysis with history of sig smoking, COPD with mixed obstructive 
restrictive lung disease, Fev1 not too low to have hypercarbia with recent 
autonomic dysfunction now with 
 
* Progressive hypercarbic resp failure -now clearly appears to be due to  
neuromuscular degenerative disease (fev1 is 1.45 and ratio was more than 70 in 
my office, and hence not contributing to his hypercarbic resp failure.
 
* Prob motor neuron disease. Unilateral left sided diaphram paralysis and has 
copd aswell- however his copd is not severe enough to explain worsening 
hypercarbia(recent ct and pet scan showed bilateral lower lobe pulm contrast 
enhancing infiltrates highly sugg of dynamic atelectasis from his left sided 
diaphramatic paralysis and hypoventilation and neurodegen disease 
 
* Cervical disc disease /  s/p surg as he had quadreperesis before with good 
recovery of function, and left diaphram paralysis is due to C spine disease
 
* Atelectasis bibasilar due to cervical dz causeing diapharm dysfunction and 
left diapharamatic paralysis, and now prob neurodegenarative disease
 
* COPD fev1 of 1.49 litres, with no copd exacerbatin
 
* REcent Cellulitis foot on antibiotics improved
 
* History of smoking 30 or more pack years quit 2016
 
* Personal history of malignant neoplasm of prostate s/p surg needs follw up in 
the future, no evidence of recurrence
 
* Mixed restrictive and obstructive lung disease 
 
* recent colon polyp removal
 
REC
Cont bipap at night needs avps at home
CT neck and head to rule out any sig path of the cspine and head, pt is 
agreeable 
Prednisone 5 mg daily for two and dc
Cont spiriva and symbicort
Pt should avoid all sedative and hypnotics 
 
ADDENDUM
Pt does qualify for bipap with back up rate AVAPS
PT has sig hypercarbia with dx of 
 
Dx neuromuscular disease
ICD G 70.9
 
* Has Sig dyspnea and resp failure, with sig symptoms of fatigue, severe dyspnea
, morning headache and severe hypoxia and hypersomnia during the day time 
needing multiple admissions to the hospital
 
* Subsequently on bipap with back up rate has had remarkable improvement of his 
symptoms and overall condition
 
 
 
Pt clearly needs Bipap with a back up rate RESP assist device  needs AVAPS 
(rate and tidal volume back up)
 
* ABG shows baseline pco2 of 71 at baseline while awake on baseline oxygen of 2 
litres
* Nocturnal oxymety shows severe desat on oxygen with less than 88 percent for a
total of one hour (60 mins)
* FOrced vital capacity is low at 1.4 litres which is less than 50 percent 
predicted for the patient (predicted is 3.4 litres)
 
PT is on Bipap of 16/6 with a back up rate of 12 and hence he needs Respiratory 
assist device avps with the same settings

## 2018-04-27 VITALS — SYSTOLIC BLOOD PRESSURE: 118 MMHG | DIASTOLIC BLOOD PRESSURE: 78 MMHG

## 2018-04-27 LAB
ABSOLUTE GRANULOCYTE CT: 8.1 /CUMM (ref 1.4–6.5)
BASOPHILS # BLD: 0 /CUMM (ref 0–0.2)
BASOPHILS NFR BLD: 0.4 % (ref 0–2)
EOSINOPHIL # BLD: 0.2 /CUMM (ref 0–0.7)
EOSINOPHIL NFR BLD: 1.8 % (ref 0–5)
ERYTHROCYTE [DISTWIDTH] IN BLOOD BY AUTOMATED COUNT: 15.2 % (ref 11.5–14.5)
GRANULOCYTES NFR BLD: 72.6 % (ref 42.2–75.2)
HCT VFR BLD CALC: 44.6 % (ref 42–52)
LYMPHOCYTES # BLD: 1.9 /CUMM (ref 1.2–3.4)
MCH RBC QN AUTO: 28.8 PG (ref 27–31)
MCHC RBC AUTO-ENTMCNC: 32.4 G/DL (ref 33–37)
MCV RBC AUTO: 88.9 FL (ref 80–94)
MONOCYTES # BLD: 0.9 /CUMM (ref 0.1–0.6)
PLATELET # BLD: 254 /CUMM (ref 130–400)
PMV BLD AUTO: 8.7 FL (ref 7.4–10.4)
RED BLOOD CELL CT: 5.01 /CUMM (ref 4.7–6.1)
WBC # BLD AUTO: 11.1 /CUMM (ref 4.8–10.8)

## 2018-04-27 NOTE — PN- HOUSESTAFF
Subjective
Follow-up For:
Acute on chronic hypercarbic respiratory failure secondary due to COPD.
Complaints: no complaints
Subjective:
Patient seen and examined at bedside.  He is on 5 L of oxygen.  He is not in any
acute distress.  He denies shortness of breath, chest pain, nausea, vomiting.  
He is agreeable and eager to go to short-term rehabilitation.
 
Review of Systems
Constitutional:
Reports: no symptoms, see HPI. 
 
Objective
Last 24 Hrs of Vital Signs/I&O
 Vital Signs
 
 
Date Time Temp Pulse Resp B/P B/P Pulse O2 O2 Flow FiO2
 
     Mean Ox Delivery Rate 
 
 0800      99 Nasal 4.0L 
 
       Cannula  
 
 0557 98.5 85 20 118/78  99 Nasal 4.0L 
 
       Cannula  
 
 0505  91    96   
 
 0351  76    98   
 
 0032  77    96   
 
 0000       BIPAP  
 
 2248  68    98   
 
 2125 98.0 95 18 130/72  98 Nasal 4.0L 
 
       Cannula  
 
 1906      94 Nasal 4.0L 
 
       Cannula  
 
 1837 98.4 111 18 118/68  92 Nasal 4.0L 
 
       Cannula  
 
 1600      97 Nasal 4.0L 
 
       Cannula  
 
 1600 98.5 92 22 100/70  97 Nasal 4.0L 
 
       Cannula  
 
 
 Intake & Output
 
 
  1600  0800  0000
 
Intake Total   260
 
Output Total  300 500
 
Balance  -300 -240
 
    
 
Intake, IV   20
 
Intake, Oral   240
 
Output, Urine  300 500
 
 
 
 
Physical Exam
General Appearance: Alert, Oriented X3, Cooperative, No Acute Distress
Cardiovascular: Regular Rate, Normal S1, Normal S2, No Murmurs
Lungs: Clear to Auscultation
Abdomen: Soft, No Tenderness, No Hepatospenomegaly
Neurological: Strength at 5/5 X4 Ext, Normal Tone, Sensation Intact, Cranial 
Nerves 3-12 NL
Extremities: No Edema
Current Medications:
 Current Medications
 
 
  Sig/Ariadna Start time  Last
 
Medication Dose Route Stop Time Status Admin
 
Acetaminophen 650 MG Q6P PRN 2000 DCD 
 
  PO   
 
Albuterol Sulfate 3 ML Q4-PRN PRN  2230 DCD 
 
  INH   1115
 
Albuterol Sulfate 2 PUF Q4-6 PRN PRN 2000 DCD 
 
  INH   
 
Atorvastatin Calcium 10 MG 1700  1700 DCD 
 
  PO   1646
 
Budesonide/ 2 PUF BID  2100 DCD 
 
Formoterol Fumarate  INH   0757
 
Cefazolin Sodium 500 MG IQ8  0000 DC 
 
  IV   0756
 
Cholecalciferol 1,000 IU DAILY  0900 DCD 
 
  PO   0756
 
Cyanocobalamin 1,000 MCG DAILY  0900 DCD 
 
  PO   0756
 
Enoxaparin Sodium 40 MG DAILY  0900 DCD 
 
  SC   0756
 
Magnesium Oxide 400 MG BID  1108 DC 
 
  PO  0901  0757
 
Patient Medication  1 ED ONE ONE  0915 CO 
 
Teaching  ED  0916  
 
Prednisone 5 MG DAILY  09 DCD 
 
  PO  0901  0757
 
 
 
 
Last 24 Hrs of Lab/Ned Results
Last 24 Hrs of Labs/Mics:
 Laboratory Tests
 
18 0628:
Anion Gap 7, Estimated GFR > 60, Glucose 80, Calcium 9.1, Phosphorus 4.2, 
Magnesium 1.9, Total Bilirubin 0.8, AST 65  H, ALT 66, Albumin 3.4  L, CBC w 
Diff NO MAN DIFF REQ, RBC 5.01, MCV 88.9, MCH 28.8, MCHC 32.4  L, RDW 15.2  H, 
MPV 8.7, Gran % 72.6, Lymphocytes % 17.2  L, Monocytes % 8.0, Eosinophils % 1.8,
Basophils % 0.4, Absolute Granulocytes 8.1  H, Absolute Lymphocytes 1.9, 
Absolute Monocytes 0.9  H, Absolute Eosinophils 0.2, Absolute Basophils 0
 
 
Assessment/Plan
Assessment:
Mr. Santiago is 72-year-old gentleman with past medical history significant for 
COPD on 2 L nocturnal oxygen, chronic constipation, hyperlipidemia, chronic 
respiratory failure, left-sided diaphramatic paralysis, total right knee 
replacement , severe degenerative joint disease status post extensive 
cervical spine surgery 2017, former smoker, COPD with mixed obstructive and 
restrictive lung disease presented to the emergency room for evaluation of 
worsening short of breath, lethargy, confusion for few hours.
 
Assessment and plan
Acute on chronic hypercarbic respiratory failure
History of COPD
History of left-sided diaphragmatic paralysis
Leukocytosis likely steroid-induced
History of cervical spondylosis status post surgery
Left foot cellulitis
Constipation
History of hyperlipidemia
 
* Acute on chronic hypercarbic respiratory failure secondary due to 
neuromuscular degenerative disease.  Patient is being followed by Dr. Dozier.  
Patient also has left-sided diaphragm paralysis and COPD.  Patient is on 
nocturnal BiPAP and on 4 L nasal oxygen.  We will continue the same.
* Patient is on antibiotics which we will discontinue upon discharge.
* We will give prednisone 5 mg one more dose tomorrow and then discontinue.
* Syndrome with albuterol,BEVESPI, DuoNeb.  Start him on Diamox 250 [Monday/
Thursday], theophylline  every 48, potassium and magnesium supplement.
 
 
 
 
 
Problem List:
 1. Hypercapnic respiratory failure
 
Pain Ratin
Pain Location:
NONE
Pain Goal: Remain pain free
Pain Plan:
TYLENOL
Tomorrow's Labs & Rationales:
CBC,BEP

## 2018-04-27 NOTE — PN- PULMONARY
Subjective
HPI/Critical Care Issues:
Doing better and improved
 
 
Objective
Current Medications:
 Current Medications
 
 
  Sig/Ariadna Start time  Last
 
Medication Dose Route Stop Time Status Admin
 
Acetaminophen 650 MG Q6P PRN 04/24 2000 AC 
 
  PO   
 
Albuterol Sulfate 3 ML Q4-PRN PRN 04/24 2230 AC 04/25
 
  INH   1115
 
Albuterol Sulfate 2 PUF Q4-6 PRN PRN 04/24 2000 AC 
 
  INH   
 
Atorvastatin Calcium 10 MG 1700 04/25 1700 AC 04/26
 
  PO   1646
 
Budesonide/ 2 PUF BID 04/24 2100 AC 04/27
 
Formoterol Fumarate  INH   0757
 
Cefazolin Sodium 500 MG IQ8 04/25 0000 AC 04/27
 
  IV   0756
 
Cholecalciferol 1,000 IU DAILY 04/25 0900 AC 04/27
 
  PO   0756
 
Cyanocobalamin 1,000 MCG DAILY 04/25 0900 AC 04/27
 
  PO   0756
 
Enoxaparin Sodium 40 MG DAILY 04/25 0900 AC 04/27
 
  SC   0756
 
Magnesium Oxide 400 MG BID 04/26 1108 DC 04/27
 
  PO 04/27 0901  0757
 
Patient Medication  1 ED ONE ONE 04/27 0915 DC 
 
Teaching  ED 04/27 0916  
 
Prednisone 5 MG DAILY 04/27 0900 AC 04/27
 
  PO 04/28 0901  0757
 
 
 
 
Vital Signs & I&O
Last 24 Hrs of Vitals and I&O:
 Vital Signs
 
 
Date Time Temp Pulse Resp B/P B/P Pulse O2 O2 Flow FiO2
 
     Mean Ox Delivery Rate 
 
04/27 0800      99 Nasal 4.0L 
 
       Cannula  
 
04/27 0557 98.5 85 20 118/78  99 Nasal 4.0L 
 
       Cannula  
 
04/27 0505  91    96   
 
04/27 0351  76    98   
 
04/27 0032  77    96   
 
04/27 0000       BIPAP  
 
04/26 2248  68    98   
 
04/26 2125 98.0 95 18 130/72  98 Nasal 4.0L 
 
       Cannula  
 
04/26 1906      94 Nasal 4.0L 
 
       Cannula  
 
04/26 1837 98.4 111 18 118/68  92 Nasal 4.0L 
 
       Cannula  
 
04/26 1600      97 Nasal 4.0L 
 
       Cannula  
 
04/26 1600 98.5 92 22 100/70  97 Nasal 4.0L 
 
       Cannula  
 
04/26 1346      93 Nasal 4.0L 
 
       Cannula  
 
04/26 1200      96 Nasal 4.0L 
 
       Cannula  
 
 
 Intake & Output
 
 
 04/27 1600 04/27 0800 04/27 0000
 
Intake Total   260
 
Output Total  300 500
 
Balance  -300 -240
 
    
 
Intake, IV   20
 
Intake, Oral   240
 
Output, Urine  300 500
 
 
 
 
Impression/Plan
 
Impression/Plan
Impression/Plan:
 ECHO
CONCLUSIONS 
 Normal size left ventricle. 
 Normal left ventricular wall thickness. 
 Normal left ventricular ejection fraction visually estimated at   > 
 60%. 
 Mild mitral regurgitation. 
 Trace aortic regurgitation. 
 
 Right ventricular systolic pressure estimated to be elevated at  42   
 mmHg. 
 Mild pulmonic regurgitation. 
 Final Date:      08 January 2018  
 
 
General Appearance on bipap and in resp failure 
Skin No Rashes
Skin Temp/Moisture Exam: Warm/Dry
HEENT Atraumatic, PERRLA, EOMI, Mucous Membr. moist/pink
Neck Supple
Lymphatic No cervical lymphadenopathy 
Cardiovascular Regular Rate, Normal S1, Normal S2, No Murmurs
Lungs Clear to Auscultation, Decrease air entery of left lung 
Abdomen Normal Bowel Sounds, Soft, No Tenderness
Neurological Normal Speech, Strength at 5/5 X4 Ext, Normal Tone, Sensation 
Intact, Cranial Nerves 3-12 NL, Hyperreflexia x4 clonus fasculation 
Extremities No Clubbing, No Cyanosis, No Edema, Normal Pulses
multiple fasciculation
 
IMPRESSION
 
PT with recent extensive cervical spine surg for severe djd, previous left sided
diapharam paralysis with history of sig smoking, COPD with mixed obstructive 
restrictive lung disease, Fev1 not too low to have hypercarbia with recent 
autonomic dysfunction now with 
 
* Progressive hypercarbic resp failure -now clearly appears to be due to  
neuromuscular degenerative disease (fev1 is 1.45 and ratio was more than 70 in 
my office, and hence not contributing to his hypercarbic resp failure.
 
* Prob motor neuron disease. Unilateral left sided diaphram paralysis and has 
copd aswell- however his copd is not severe enough to explain worsening 
hypercarbia(recent ct and pet scan showed bilateral lower lobe pulm contrast 
enhancing infiltrates highly sugg of dynamic atelectasis from his left sided 
diaphramatic paralysis and hypoventilation and neurodegen disease 
 
* Cervical disc disease /  s/p surg as he had quadreperesis before with good 
recovery of function, and left diaphram paralysis is due to C spine disease
 
* Atelectasis bibasilar due to cervical dz causeing diapharm dysfunction and 
left diapharamatic paralysis, and now prob neurodegenarative disease
 
* COPD fev1 of 1.49 litres, with no copd exacerbatin
 
* REcent Cellulitis foot on antibiotics improved
 
* History of smoking 30 or more pack years quit 2016
 
* Personal history of malignant neoplasm of prostate s/p surg needs follw up in 
the future, no evidence of recurrence
 
* Mixed restrictive and obstructive lung disease 
 
* recent colon polyp removal
 
REC
OK to dc on current meds and if bipap is arrange ok to dc
Dc abx
I gave him a script for a neb machine and duo neb to use tid prn
Cont bipap at night needs avps at home
Prednisone 5 mg daily for one more day and dc
PT can go home on prn albuterol and bevespi ( he has at home) 
Cont spiriva and symbicort while in hospital and do not dc on this
Dc on mag 400 daily
Potassium otc supp
Diamox 250 mg monday and thursday upon dc
theophylline sr 300 mg qod upon dc
Pt should avoid all sedative and hypnotics 
 
ADDENDUM
Pt does qualify for bipap with back up rate AVAPS
PT has sig hypercarbia with dx of 
 
Dx neuromuscular disease
ICD G 70.9
 
* Has Sig dyspnea and resp failure, with sig symptoms of fatigue, severe dyspnea
, morning headache and severe hypoxia and hypersomnia during the day time 
needing multiple admissions to the hospital
 
* Subsequently on bipap with back up rate has had remarkable improvement of his 
symptoms and overall condition
 
 
 
Pt clearly needs Bipap with a back up rate RESP assist device  needs AVAPS 
(rate and tidal volume back up)
 
* ABG shows baseline pco2 of 71 at baseline while awake on baseline oxygen of 2 
litres
* Nocturnal oxymety shows severe desat on oxygen with less than 88 percent for a
total of one hour (60 mins)
* FOrced vital capacity is low at 1.4 litres which is less than 50 percent 
predicted for the patient (predicted is 3.4 litres)
 
PT is on Bipap of 16/6 with a back up rate of 12 and hence he needs Respiratory 
assist device avps with the same settings

## 2022-04-18 NOTE — PN- RESIDENT CRCU
Subjective
HPI/CRCU Issues:
Acute on chronic hypercarbic respiratory failure
History of COPD
History of left-sided diaphragmatic paralysis
Leukocytosis likely steroid-induced
History of cervical spondylosis status post surgery
Left foot cellulitis
Constipation
History of hyperlipidemia
 
24 Hour Events:
Patient states he is able to sleep better at night on BiPAP, but does not notice
any improvement in his breathing during the daytime despite being on 4 L of 
oxygen(uses 2 L at home).  Denies any fever/chills, cough, sputum production or 
any overnight events.  He is agreeable to doing the CT cervical spine and head 
today. 
 
Objective
 
Vital Signs & I&O
Last 8 Hrs of Vitals and I&O:
 Intake & Output
 
 
  1600
 
Intake Total 1340
 
Output Total 350
 
Balance 990
 
  
 
Intake, IV 20
 
Intake, Oral 1320
 
Number 1
 
Bowel 
 
Movements 
 
Output, Urine 350
 
 
 
 
Exam
General Appearance: no apparent distress, alert, awake, comfortable
Head: atraumatic, normal appearance
Respiratory: decreased breath sounds
Cardiovascular: regular rate/rhythm
Gastrointestinal: normal bowel sounds, soft, non-tender
Extremities: normal inspection, no edema
Cranial Nerves: normal hearing, normal speech, PERRL
Current Medications:
 Current Medications
 
 
  Sig/Ariadna Start time  Last
 
Medication Dose Route Stop Time Status Admin
 
Acetaminophen 650 MG Q6P PRN 2000 AC 
 
  PO   
 
Albuterol Sulfate 3 ML Q4-PRN PRN  2230 AC 
 
  INH   1115
 
Albuterol Sulfate 2 PUF Q4-6 PRN PRN 2000 AC 
 
  INH   
 
Atorvastatin Calcium 10 MG 1700  1700 AC 
 
  PO   1750
 
Budesonide/ 2 PUF BID  2100 AC 
 
Formoterol Fumarate  INH   0756
 
Cefazolin Sodium 500 MG IQ8  0000 AC 
 
  IV   0755
 
Cholecalciferol 1,000 IU DAILY  0900 AC 
 
  PO   0755
 
Cyanocobalamin 1,000 MCG DAILY  0900 AC 
 
  PO   0755
 
Enoxaparin Sodium 40 MG DAILY  09 AC 
 
  SC   0755
 
Magnesium Oxide 400 MG BID  1108 AC 
 
  PO  09  1228
 
Phosphate 250 MG PC AND AT BEDTIME  0900 DC 
 
  PO 2101  202
 
Prednisone 5 MG DAILY  09 AC 
 
  PO  09  
 
Prednisone 10 MG DAILY  0900 DC 
 
  PO  0901  0755
 
 
 
 
Impression/Plan
 
Impression/Problem List
Impression:
Mr. Santiago is 72-year-old gentleman with past medical history significant for 
COPD on 2 L nocturnal oxygen, chronic constipation, hyperlipidemia, chronic 
respiratory failure, left-sided diaphramatic paralysis, total right knee 
replacement , severe degenerative joint disease status post extensive 
cervical spine surgery 2017, former smoker, COPD with mixed obstructive and 
restrictive lung disease presented to the emergency room for evaluation of 
worsening short of breath, lethargy, confusion for few hours.
 
Patient was recently discharged from [facility] on 2018 after being 
treated for acute hypercarbic respiratory failure.  He was also started on abx 
for left foot cellulitis(day 5 of abx on admission). 
 
Patient was admitted to the ICU for management of following issues;
Acute on chronic hypercarbic respiratory failure
History of COPD
History of left-sided diaphragmatic paralysis
Leukocytosis likely steroid-induced
History of cervical spondylosis status post surgery
Left foot cellulitis
Constipation
History of hyperlipidemia
 
 
Respiratory;
1. Acute on chronic hypercarbic respiratory failure; likely multifactorial from 
left-sided diaphragmatic paralysis and COPD, however not at end-stage COPD to 
explain worsening hypercarbia.  Recent PET scan showed bilateral lower lobe 
pulmonary infiltrates suggestive of dynamic atelectasis from left-sided 
diaphragmatic patency. He has also been extensively worked up as an outpatient 
by his neurologist for motoneuron disease/ALS that can lead to respiratory 
failure.
Patient presented with worsening shortness of breath, lethargy, confusion.  ABGs
in the emergency room showed pH 7.32, carbon dioxide 74, bicarbonate 38.  Given 
his acute hypercarbic respiratory failure he was placed on BiPAP with 
improvement of his symptoms.  Repeat ABGs showed a pH of 7.36 and a PCO2 of 71. 
He was transitioned to nasal cannula, continues to be on 4 L of oxygen. Chest x-
ray showed Low lung volumes and subsegmental atelectasis or scar involving the 
left lower lobe. No overt airspace disease. Vital capacity 1.4, FEV1 1.45. Sniff
test diaphragmatic fluoroscopy negative for any right-sided diaphragmatic palsy.
 
 
* Monitor vitals every shift
* BiPAP at night 
* Qualifies for home Bipap with back up rate 
* Continue oxygen supplementation to maintain saturation above 92
* TRC nebs
* Will complete his prednisone taper for 2 more days- 5 mg daily
* MRI head and cervical spine was refused by the patient yesterday as he can not
lie down flat because of difficulty breathing. Aggreable to CT scan today to 
look for any CNS pathology leading to hypercarbic respiratory failure. 
 
Infectious;
1. Left foot cellulitis
Patient was started on Keflex 500 every 6hrs for left foot cellulitis during his
last admission- 7 day course. Day 7 of Abx, will discontinue after today's dose.
 
 
Cardiology;
1. Hyperlipidemia
- Continue Lipitor 10mg daily
 
Heme;
Leukocytosis
WBC was 12.8 most likely from recent steroid use, WNL today. 
- continue to monitor. 
 
Metabolic;
- Continue to monitor electrolytes and replete accordingly.
 
Alimentary;
1. Constipation
- Continue senna as required for constipation
- Regular diet
 
Neuro;
1. History of cervical spondylosis status post surgery
He had MRI cervical spine and head in 2017 for evaluation of flaccid 
atrophy of left upper extremity and quadriparesis.  He has advanced cervical 
spondylosis at C5-C6,, compression of cervical spinal cord multiple degenerative
changes, severe central canal stenosis-status post extensive cervical spine 
surgery in 2017. 
 
DVT Prophylaxis; Alps and subcutaneous Lovenox
Patient is full code
Problem List:
 1. Hypercapnic respiratory failure
 
 2. Cellulitis
 
Pain Ratin
Pain Location:
NA
Pain Goal: Remain pain free
Pain Plan:
NA
Tomorrow's Labs & Rationales:
CBC
ICU bundle
 
Plan
DVT/Prophylaxis: mechanical, pharmacological Detail Level: Simple Price (Do Not Change): 0.00 Instructions: This plan will send the code FBSD to the PM system.  DO NOT or CHANGE the price.

## 2022-10-07 NOTE — HISTORY & PHYSICAL
MeekmathewAnthony vuong 04/24/18 1948:
General Information and HPI
MD Statement:
I have seen and personally examined MICAH TRISTAN CHRISTI HINES and documented this H&P.
 
The patient is a 72 year old M who presented with a patient stated chief 
complaint of shortness of breath, lethargy, confusion for a few hours
 
Source of Information: patient, family, old records
Exam Limitations: no limitations
History of Present Illness:
This is a 72-year-old male with past medical history significant for COPD on 2 L
nocturnal oxygen, chronic constipation, hyperlipidemia, chronic respiratory 
failure, left-sided diaphramatic paralysis, total right knee replacement 2017, 
severe degenerative joint disease status post extensive cervical spine surgery 
Nov 2017, former smoker, COPD with mixed obstructive and restrictive lung 
disease presented to the emergency room for evaluation of worsening short of 
breath, lethargy, confusion for few hours.
 
 
Patient was recently discharged from [facility] on 04/19/2018 after being 
treated for acute hypercarbic respiratory failure.  He was also treated for left
foot cellulitis with 7 day course of antibiotics.  Deep vein thrombosis was 
ruled out.
 
 
According to the patient and wife who is at bedside patient has been doing well 
since discharge from [facility].  However he was very short of breath this
morning, became lethargic and confused eventually.  He was noted to have low 
oxygen saturation 90% on 2 L.  He was very lethargic, falling asleep during the 
conversation.  Visiting nurse advised the family to bring him to the emergency 
room for evaluation of hypercarbic respiratory failure, given his history of 
carbon dioxide retention and chronic respiratory failure.
 
 
ABG in the emergency room revealed pH 7.32, carbon dioxide 74, bicarbonate 38, 
placed on BiPAP for few hours.  At the time of my exam patient was off from 
BiPAP.  He is alert awake and oriented 3.  Denied any fever, chills, chest pain
, palpitations, productive cough, nausea, vomiting, abdominal pain, change in 
bladder or bowel habits.  He reports minimal shortness of breath, requiring 4 L 
oxygen supplementation to maintain saturations above 92.  He is able to speak in
complete sentences. off note patient uses 2 pillows to sleep.
 
 
 
In October 2017 he was evaluated for left diaphragm paralysis.  Patient 
underwent sniff test which showed that his left diaphragm remains elevated it is
positioned approximately 3-4 cm above the level of the right diaphragm. The 
elevated diaphragm produces mild compressive atelectasis
at the left base. There is normal inferior excursion of the right diaphragm
during inspiration. Findings are consistent with left diaphragm paralysis.
 
He also underwent MRI cervical spine and head in September 2017 for evaluation 
of flaccid atrophy of left upper extremity and quadriparesis.  He has advanced 
cervical spondylosis at C5-C6,, compression of cervical spinal cord multiple 
degenerative changes, severe central canal stenosis-status post extensive 
cervical spine surgery in nov 2017
 
Allergies/Medications
Allergies:
Coded Allergies:
morphine (Severe, VOMITING 04/13/18)
lactose (GI UPSET 08/07/17)
 
Home Med list
Albuterol Sulfate (Proair Hfa) 90 MCG HFA.AER.AD   2 PUF INH Q4-6 PRN PRN copd  
(Reported)
Budesonide/Formoterol Fumarate (Symbicort 160-4.5 Mcg Inhaler) 160 MCG-4.5 MCG/
ACTUATION HFA.AER.AD   2 PUF INH BID COPD
Cephalexin (Keflex) 500 MG CAPSULE   1 CAP PO Q6 CELLULITIS
Cholecalciferol (Vitamin D3) 1,000 UNIT TABLET   1 TAB PO DAILY SUPPLEMENT  (
Reported)
Cyanocobalamin (Vitamin B-12) 1,000 MCG TABLET   1 TAB PO DAILY MENTAL HEATLH  (
Reported)
Docusate Sodium 100 MG CAPSULE   100 MG PO BID PRN CONSTIPATION
     STOOL SOFTENER, AVAILABLE OVER THE COUNTER
Glycopyrrolate/Formoterol Fum (Bevespi Aerosphere Inhaler) 9 MCG-4.8 MCG 
HFA.AER.AD   2 PUFF INH BID BREATHING PROBLEMS  (Reported)
Multivitamin (One Daily Multivitamin) 1 EACH TABLET   1 TAB PO DAILY VITAMIN 
SUPPORT  (Reported)
Omega-3 Fatty Acids/Fish Oil (Fish Oil 1,000 MG Capsule) 340 MG-1,000 MG CAPSULE
  1 CAP PO DAILY SUPPLEMENT  (Reported)
Potassium Chloride (Klor-Con) 20 MEQ PACKET   1 PAC PO DAILY POTASSIUM
Prednisone 10 MG TABLET   1 TAB PO DAILY SHORTNESS OF BREATH
     DATE        TAB
     4/21        3
     4/22-24     2
     4/25-27     1
Rosuvastatin Calcium (Crestor) 10 MG TABLET   1 TAB PO DAILY CHOLESTEROL  (
Reported)
Ubidecarenone (Coenzyme Q-10) 200 MG CAPSULE   1 TAB PO DAILY HEART HEALTH  (
Reported)
 
Compliance With Home Meds: GOOD
 
Past History
 
Travel History
Traveled to [place] past 21 day No
 
Medical History
Neurological: NONE
EENT: NONE
Cardiovascular: HYPERCHOLESTERMIA
Respiratory: COPD, 2L AT BASE DIAPHRAGMATIC PARALYSIS  ON L SIDE
Gastrointestinal: NONE
Hepatic: NONE
Renal: NONE
Musculoskeletal: degen joint disease, L SIDE WEAK
Psychiatric: NONE
Endocrine: NONE
Blood Disorders: NONE
Cancer(s): prostate cancer, SKIN CA
GYN/Reproductive: NONE
History of MRSA: No
History of VRE: No
History of CDIFF: No
Influenza Vaccine: 11/02/17
Tetanus Vaccine: 01/06/18
 
Surgical History
Surgical History: TONSILLECTOMY PROSTATECTOMY ULNAR NERVE REPAIR SKIN CA 
EXCISION TKR RIGHT LAMINECTOMY
 
Past Family/Social History
 
Psychosocial History
Smoking Status: Former Smoker
ETOH Use: denies use
Illicit Drug Use: denies illicit drug use
 
Review of Systems
 
Review of Systems
Constitutional:
Reports: weakness.  Denies: diaphoresis, fever, malaise, unexplained weight 
loss. 
EENTM:
Denies: blurred vision, double vision. 
Cardiovascular:
Denies: chest pain, orthopena, palpitations, peripheral edema, syncope. 
Respiratory:
Reports: short of breath.  Denies: cough, hemoptysis, orthopnea, sputum 
production, stridor, wheezing. 
GI:
Denies: abdominal pain, bloating, constipation, diarrhea. 
Genitourinary:
Denies: discharge, dysuria, frequency. 
Musculoskeletal:
Denies: back pain, gout, joint pain. 
Skin:
Denies: dryness, erythema. 
Neurological/Psychological:
Denies: headache, numbness, pre-existing deficit, tingling, tremors. 
 
Exam & Diagnostic Data
Last 24 Hrs of Vital Signs/I&O
 Vital Signs
 
 
Date Time Temp Pulse Resp B/P B/P Pulse O2 O2 Flow FiO2
 
     Mean Ox Delivery Rate 
 
04/24 2055  74 18 123/66  95 Nasal 3.0L 
 
       Cannula  
 
04/24 1738 97.0 73 18 139/81  95 BIPAP 30% 
 
04/24 1636  87 18 162/78  97 BIPAP  
 
04/24 1602      96   
 
04/24 1447  78    99   
 
04/24 1446      94 Nasal 2.0L 
 
       Cannula  
 
04/24 1405 96.0 96 20 182/88  91 Nasal 2.0L 
 
       Cannula  
 
 
 Intake & Output
 
 
 04/24 1600 04/24 0800 04/24 0000
 
Intake Total   
 
Output Total 200  
 
Balance -200  
 
    
 
Output, Urine 200  
 
Patient 71.214 kg  
 
Weight   
 
Weight Estimated  
 
Measurement   
 
Method   
 
 
 
 
Physical Exam
General Appearance Alert, Oriented X3, Cooperative, No Acute Distress
Skin No Rashes, No Breakdown
Skin Temp/Moisture Exam: Warm/Dry
Sepsis Skin Exam (color): Normal for Ethnicity
HEENT Atraumatic, PERRLA, EOMI, Mucous Membr. moist/pink
Neck Supple, No JVD
Lymphatic Axillary nl, Cervical nl
Cardiovascular Regular Rate, Normal S1, Normal S2, No Murmurs
Lungs Normal Air Movement, on bipap for few hours s/p off from bipap, decreased 
air entry left lung
Abdomen Normal Bowel Sounds, Soft, No Tenderness
Neurological Normal Speech, Strength at 5/5 X4 Ext, Normal Tone, Sensation 
Intact, Cranial Nerves 3-12 NL, Reflexes 2+, left arm fasciculations
Extremities No Clubbing, No Cyanosis, No Edema
Vascular Normal Pulses, Pulses Symmetrical
Sepsis Peripheral Pulse Location: Dorsalis Pedis
Sepsis Peripheral Pulse Exam: Normal
Sepsis Cap Refill Exam: <2 Sec
Last 24 Hrs of Labs/Ned:
 Laboratory Tests
 
04/24/18 1851:
Urine Opiates Screen < 100, Methadone Screen < 40, Barbiturate Screen < 60, Ur 
Phencyclidine Scrn < 6.00, Amphetamines Screen < 100, U Benzodiazepines Scrn < 
85, Urine Cocaine Screen < 50, Urine Cannabis Screen < 5.00, Urine Color YEL, 
Urine Clarity CLEAR, Urine pH 7.0, Ur Specific Gravity 1.015, Urine Protein NEG,
Urine Ketones NEG, Urine Nitrite NEG, Urine Bilirubin NEG, Urine Urobilinogen 
0.2, Ur Leukocyte Esterase NEG, Ur Microscopic EXAM NOT REQUIRED, Urine 
Hemoglobin NEG, Urine Glucose NEG
 
04/24/18 1717:
Lactic Acid Cancelled
 
04/24/18 1437:
Anion Gap 9, Estimated GFR > 60, BUN/Creatinine Ratio 22.5, Glucose 101  H, 
Lactic Acid 1.4, Calcium 9.3, Phosphorus 4.5, Magnesium 1.9, Total Bilirubin 0.5
, AST 73  H, ALT 75  H, Alkaline Phosphatase 50, Troponin I < 0.01, Pro-B-
Natriuretic Pept 23.8, Total Protein 6.5, Albumin 3.9, Globulin 2.6, Albumin/
Globulin Ratio 1.5, CBC w Diff MAN DIFF ORDERED, RBC 5.30, MCV 89.4, MCH 28.5, 
MCHC 31.9  L, RDW 15.6  H, MPV 8.9, Gran % 90.9  H, Lymphocytes % 4.9  L, 
Monocytes % 4.0, Eosinophils % 0.1, Basophils % 0.1, Absolute Granulocytes 11.6 
H, Absolute Lymphocytes 0.6  L, Absolute Monocytes 0.5, Absolute Eosinophils 0, 
Absolute Basophils 0, Platelet Estimate ADEQUATE, Normocytic RBCs VERIFIED, 
Normochromic RBCs VERIFIED
 
04/24/18 1430:
pH 7.32  L, pCO2 74 *H, pO2 78  L, HCO3 38  H, ABG O2 Sat (Measured) 95.0  L, 
Carboxyhemoglobin 1.0  L, O2 Concentration % 2L, O2 Delivery Method NC, 
Phlebotomy Draw Site RIGHT RADIAL
 
 
Assessment/Plan
Assessment:
This is a 72-year-old male with past medical history significant for COPD on 2 L
nocturnal oxygen, chronic constipation, hyperlipidemia, chronic respiratory 
failure, left-sided diaphramatic paralysis, total right knee replacement 2017, 
severe degenerative joint disease status post extensive cervical spine surgery 
Nov 2017, former smoker, COPD with mixed obstructive and restrictive lung 
disease presented to the emergency room for evaluation of worsening short of 
breath, lethargy, confusion for few hours.
 
Patient was recently discharged from [facility] on 04/19/2018 after being 
treated for acute hypercarbic respiratory failure.  He was also treated for left
foot cellulitis with 7 day course of antibiotics.  Deep vein thrombosis was 
ruled out.
 
 
 
-----------------------------------------------------------------------------
 
Vitals at the time of presentation 
afebrile, heart rate 73, respiratory rate 18, blood pressure 135/80, saturating 
at 95 on 4 L oxygen supplementation.  He was on BiPAP for  few hours in the 
emergency room.
 
 
Pertinent labs
Leukocytosis 12.8, hemoglobin 15, hematocrit 47, platelets 288
ABG 7.32, 74, 38, 95
Sodium 147, potassium 4.4, BUN 9 and creatinine 0.4
Lactic acid 1.4
LFTs 0.5, 73, 75 
troponin negative
EKG showed sinus tachycardia, 100, left anterior fascicular block, no acute ST-T
wave changes
Chest x-ray showed Low lung volumes and subsegmental atelectasis or scar 
involving the left
lower lobe. No overt airspace disease.
 
------------------------------------------------------------------------
 
Acute on chronic hypercarbic respiratory failure
Patient presented with worsening shortness of breath, lethargy, confusion.  ABGs
in the emergency room showed pH 7.32, carbon dioxide 74, bicarbonate 38.  Given 
his acute hypercarbic respiratory failure he was placed on BiPAP for a few hours
after which he was improved and was transitioned to nasal cannula. Chest x-ray 
showed Low lung volumes and subsegmental atelectasis or scar involving the left 
lower lobe. No overt airspace disease.
 
 
* Acute on chronic progressive hypercarbic respiratory failure, recently 
admitted for similar complaint.  Looks like multifactorial etiology.  Patient 
has history of left-sided diaphragmatic past which is contributing to 
respiratory failure and hypoventilation.  Also he was extensively worked up as 
an outpatient by his neurologist for motoneuron disease/ALS leading to 
respiratory failure.  Also patient has history of COPD however not at end-stage 
COPD to explain worsening hypercarbia.  Recent PET scan showed bilateral lower 
lobe pulmonary infiltrates suggestive of dynamic atelectasis from left-sided 
diaphragmatic patency.
* Admit to ICU for acute hypercarbic respiratory failure
* Monitor vitals every shift
* Currently off from BiPAP
* Provide oxygen supplementation to maintain saturation above 92
* When necessary BiPAP
* TRC nebs
* ABG as needed
* Will complete his prednisone taper for 3 more days- 10 mg daily
* We will get MRI head and cervical spine to look for any CNS pathology leading 
to hypercarbic respiratory failure
* Will get sniff test diaphragmatic fluoroscopy to look for right-sided 
diaphragmatic palsy
* Avoid sedative medications
 
 
 
 
 
Left-sided diaphragmatic palsy
In October 2017 he was evaluated for left diaphragm paralysis.  Patient 
underwent sniff test which showed that his left diaphragm remains elevated it is
positioned approximately 3-4 cm above the level of the right diaphragm. The 
elevated diaphragm produces mild compressive atelectasis
at the left base. There is normal inferior excursion of the right diaphragm 
during inspiration. Findings are consistent with left diaphragm paralysis.
* Given his chronic respiratory failure, acute on chronic hypercarbic 
respiratory failure leading to multiple admissions - will get sniff test to look
for bilateral diaphragmatic paralysis
* F/U sniff test diaphragmatic fluoroscopy
 
 
 
 
History of cervical spondylosis status post surgery
He had MRI cervical spine and head in September 2017 for evaluation of flaccid 
atrophy of left upper extremity and quadriparesis.  He has advanced cervical 
spondylosis at C5-C6,, compression of cervical spinal cord multiple degenerative
changes, severe central canal stenosis-status post extensive cervical spine 
surgery in nov 2017
 
 
 
COPD
On nocturnal oxygen 2 L
COPD FEV1  1.49 L, 
not in COPD exacerbation
Continue TRC nebs
Complete prednisone taper which was started during last admission
 
 
 
Left foot cellulitis
Patient was started on Keflex 500 every 6hrs for left foot cellulitis during his
last admission- 7 day course.  Will complete his antibiotic course by 04/25/
2018.
 
 
 
Constipation
Continue senna as required for constipation
 
 
Hyperlipidemia
Continue Lipitor 10 daily
 
 
Leukocytosis
WBC 12.8 most likely from recent steroid use
 
 
DVT prophylaxis alps and Lovenox subcutaneous
Regular diet
Full code
Pain pathway Tylenol was ordered
 
As Ranked By This Provider
Problem List:
 1. Hypercapnic respiratory failure
 
 
Core Measures/Misc (9/17)
 
Acute Coronary Syndrome
ACS Diagnosis: No
 
Congestive Heart Failure
Congestive Heart Failure Diagnosis No
 
Cerebrovascular Accident
CVA/TIA Diagnosis: No
 
VTE (View Protocol)
VTE Risk Factors No risk factors
No Mechanical VTE Prophylaxis d/t N/A MechProphylax Ordered
No VTE Pharm Prophylaxis d/t NA PharmProphylax ordered
 
Sepsis (View protocol)
Sepsis Present: No
 
 
Alex Dozier MD 04/24/18 1958:
Attending MD Review Statement
 
Attending Statement
Attending MD Statement: examined this patient, discuss w/resident/PA/NP, agreed 
w/resident/PA/NP, discussed with family, reviewed EMR data (avail), discussed 
with nursing, discussed with case mgmt, reviewed images, amended to note
Attending Assessment/Plan:
Seen and examined independently
General Appearance on bipap and in resp failure 
Skin No Rashes
Skin Temp/Moisture Exam: Warm/Dry
HEENT Atraumatic, PERRLA, EOMI, Mucous Membr. moist/pink
Neck Supple
Lymphatic No cervical lymphadenopathy 
Cardiovascular Regular Rate, Normal S1, Normal S2, No Murmurs
Lungs Clear to Auscultation, Decrease air entery of left lung 
Abdomen Normal Bowel Sounds, Soft, No Tenderness
Neurological Normal Speech, Strength at 5/5 X4 Ext, Normal Tone, Sensation 
Intact, Cranial Nerves 3-12 NL, Hyperreflexia x4 clonus fasculation 
Extremities No Clubbing, No Cyanosis, No Edema, Normal Pulses
multiple fasciculation
 
IMPRESSION
 
PT with recent extensive cervical spine surg for severe djd, previous left sided
diapharam paralysis with history of sig smoking, COPD with mixed obstructive 
restrictive lung disease, Fev1 not too low to have hypercarbia with recent 
autonomic dysfunction now with 
 
* Progressive hypercarbic resp failure - multifactorial - Rule out motor neuron 
disease vs Rule out bilateral diaphramatic paralysis His copd is not severe 
enough to explain worsening hypercarbia(recent ct and pet scan showed bilateral 
lower lobe pulm contrast enhancing infiltrates highly sugg of dynamic 
atelectasis from his diaphramatic paralysis especially in the left side). (PET 
nil acute)
 
* Cervical disc disease /  s/p surg as he had quadreperesis before with good 
recovery of function
 
* Atelectasis bibasilar due to cervical dz causeing diapharm dysfunction and 
left diapharamatic paralysis, rule out rt sided diaphramatic paralysis
 
* COPD fev1 of 1.49 litres, with no copd exacerbatin
 
* REcent Cellulitis foot on antibiotics
 
* History of smoking 30 or more pack years quit 2016
 
* Personal history of malignant neoplasm of prostate s/p surg needs follw up in 
the future
 
* Mixed restrictive and obstructive lung disease 
 
* recent colon polyp removal
 
REC
Admit and cont bipap
once improved will get MRI of the head and neck to rule out any CNS cause for 
hypercarbia
Needs sniff test (diaphramatic fluroscopy) to rule out rt sided diaphramatic 
paralysis (has left sided paralysis already)
Prednisone 10 mg daily
Nebs prn
Need to rule out motor neuron disease as out pt (has been worked up before)
Pt should avoid all sedative and hypnotics 
Pt is critically ill tts 46
Discussed with wife and brother self-care/home management/community/leisure

## 2024-02-23 NOTE — CT SCAN REPORT
EXAMINATION:
CT ANGIOGRAM CHEST
 
CLINICAL INFORMATION:
Hypoxia, shortness of breath
 
COMPARISON:
Same day ultrasound of the extremities.
 
TECHNIQUE:
Volumetric helical CT imaging was performed through the chest after the
administration of 70 mL of Optiray 350 intravenous contrast. Extensive
vascular post-processing including two-dimensional and three-dimensional
reformatted images were created and reviewed on an independent workstation.
 
DLP:
469 mGy-cm.
 
FINDINGS:
ANGIOGRAM: There is no evidence of pulmonary embolism within the main,
interlobar, segmental, or subsegmental pulmonary arterial vasculature. The
thoracic aorta is widely patent as well as its major branch vessels.
 
CHEST:
 
Lungs: There is mild to moderate centrilobular emphysema within the upper
lobes. There are bibasilar areas of peripheral consolidation, left greater
than right which demonstrate the "CT angiogram sign". The area on the left is
large measuring approximately up to 12 x 5 cm. The airways appear patent to
the subsegmental level. No pleural effusion.
 
Mediastinum: Incidental note made of a bovine arch. Thyroid gland is within
normal limits. Mediastinal lymph nodes measure up to 0.8 cm in short axis.
 
Pleura: There is no pleural effusion or pleural-based mass.
 
Axilla/Chest Wall: The chest wall is normal in appearance. There are no
pathologically enlarged lymph nodes.
 
UPPER ABDOMEN: The imaged portions of the upper abdominal solid viscera are
normal in appearance.
 
OSSEOUS STRUCTURES: Intact and normal in appearance. Minimal diffuse
degenerative changes throughout the visualized thoracic spine.
 
IMPRESSION:
1. No acute pulmonary embolus.
2. Bibasilar enhancing consolidations. The differential diagnosis for this
specific imaging appearance includes pneumonia, post obstructive pneumonitis
secondary to central lung tumor (not visualized on the current study),
lymphoma versus metastasis from gastrointestinal carcinoma. Pt awake in bed, spouse (Claudia) at bedside. Pt indicated gratefulness for care. After prayer, pt expressed appreciation. Pastoral Care office remains available if requested.